# Patient Record
Sex: MALE | Race: WHITE | NOT HISPANIC OR LATINO | Employment: UNEMPLOYED | URBAN - METROPOLITAN AREA
[De-identification: names, ages, dates, MRNs, and addresses within clinical notes are randomized per-mention and may not be internally consistent; named-entity substitution may affect disease eponyms.]

---

## 2017-05-03 ENCOUNTER — HOSPITAL ENCOUNTER (EMERGENCY)
Facility: CLINIC | Age: 57
Discharge: HOME | End: 2017-05-03
Attending: EMERGENCY MEDICINE | Admitting: EMERGENCY MEDICINE
Payer: COMMERCIAL

## 2017-05-03 ENCOUNTER — APPOINTMENT (OUTPATIENT)
Dept: CT IMAGING | Facility: CLINIC | Age: 57
End: 2017-05-03
Attending: EMERGENCY MEDICINE
Payer: COMMERCIAL

## 2017-05-03 VITALS
SYSTOLIC BLOOD PRESSURE: 129 MMHG | HEART RATE: 88 BPM | OXYGEN SATURATION: 97 % | DIASTOLIC BLOOD PRESSURE: 58 MMHG | RESPIRATION RATE: 14 BRPM

## 2017-05-03 DIAGNOSIS — R04.0 EPISTAXIS: ICD-10-CM
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
## 2017-05-03 DIAGNOSIS — F10.220 ACUTE ALCOHOLIC INTOXICATION IN ALCOHOLISM, UNCOMPLICATED (H): ICD-10-CM
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
## 2017-05-03 DIAGNOSIS — S01.512A LACERATION OF LABIAL MUCOSA WITHOUT COMPLICATION, INITIAL ENCOUNTER: ICD-10-CM

## 2017-05-03 LAB
ALBUMIN SERPL-MCNC: 4.2 G/DL (ref 3.4–5)
ALCOHOL BREATH TEST: 0.31 (ref 0–0.01)
ALCOHOL BREATH TEST: NORMAL (ref 0–0.01)
ALP SERPL-CCNC: 61 U/L (ref 40–150)
ALT SERPL W P-5'-P-CCNC: 43 U/L (ref 0–70)
ANION GAP SERPL CALCULATED.3IONS-SCNC: 9 MMOL/L (ref 3–14)
AST SERPL W P-5'-P-CCNC: 33 U/L (ref 0–45)
BASOPHILS # BLD AUTO: 0.1 10E9/L (ref 0–0.2)
BASOPHILS NFR BLD AUTO: 1.4 %
BILIRUB SERPL-MCNC: 0.5 MG/DL (ref 0.2–1.3)
BUN SERPL-MCNC: 19 MG/DL (ref 7–30)
CALCIUM SERPL-MCNC: 8.1 MG/DL (ref 8.5–10.1)
CHLORIDE SERPL-SCNC: 108 MMOL/L (ref 94–109)
CO2 SERPL-SCNC: 28 MMOL/L (ref 20–32)
CREAT SERPL-MCNC: 1.08 MG/DL (ref 0.66–1.25)
DIFFERENTIAL METHOD BLD: NORMAL
EOSINOPHIL # BLD AUTO: 0.1 10E9/L (ref 0–0.7)
EOSINOPHIL NFR BLD AUTO: 1 %
ERYTHROCYTE [DISTWIDTH] IN BLOOD BY AUTOMATED COUNT: 13.7 % (ref 10–15)
GFR SERPL CREATININE-BSD FRML MDRD: 71 ML/MIN/1.7M2
GLUCOSE SERPL-MCNC: 95 MG/DL (ref 70–99)
HCT VFR BLD AUTO: 45.3 % (ref 40–53)
HGB BLD-MCNC: 15.2 G/DL (ref 13.3–17.7)
IMM GRANULOCYTES # BLD: 0 10E9/L (ref 0–0.4)
IMM GRANULOCYTES NFR BLD: 0.2 %
INR PPP: 1.02 (ref 0.86–1.14)
LYMPHOCYTES # BLD AUTO: 2.2 10E9/L (ref 0.8–5.3)
LYMPHOCYTES NFR BLD AUTO: 37.5 %
MCH RBC QN AUTO: 32.3 PG (ref 26.5–33)
MCHC RBC AUTO-ENTMCNC: 33.6 G/DL (ref 31.5–36.5)
MCV RBC AUTO: 96 FL (ref 78–100)
MONOCYTES # BLD AUTO: 0.2 10E9/L (ref 0–1.3)
MONOCYTES NFR BLD AUTO: 4 %
NEUTROPHILS # BLD AUTO: 3.2 10E9/L (ref 1.6–8.3)
NEUTROPHILS NFR BLD AUTO: 55.9 %
NRBC # BLD AUTO: 0 10*3/UL
NRBC BLD AUTO-RTO: 0 /100
PLATELET # BLD AUTO: 186 10E9/L (ref 150–450)
POTASSIUM SERPL-SCNC: 3.9 MMOL/L (ref 3.4–5.3)
PROT SERPL-MCNC: 8.2 G/DL (ref 6.8–8.8)
RBC # BLD AUTO: 4.7 10E12/L (ref 4.4–5.9)
SODIUM SERPL-SCNC: 145 MMOL/L (ref 133–144)
WBC # BLD AUTO: 5.8 10E9/L (ref 4–11)

## 2017-05-03 PROCEDURE — 99284 EMERGENCY DEPT VISIT MOD MDM: CPT | Mod: Z6 | Performed by: EMERGENCY MEDICINE

## 2017-05-03 PROCEDURE — 85025 COMPLETE CBC W/AUTO DIFF WBC: CPT | Performed by: EMERGENCY MEDICINE

## 2017-05-03 PROCEDURE — 99285 EMERGENCY DEPT VISIT HI MDM: CPT | Mod: 25

## 2017-05-03 PROCEDURE — 82075 ASSAY OF BREATH ETHANOL: CPT | Mod: 91

## 2017-05-03 PROCEDURE — 70486 CT MAXILLOFACIAL W/O DYE: CPT

## 2017-05-03 PROCEDURE — 25000132 ZZH RX MED GY IP 250 OP 250 PS 637: Performed by: EMERGENCY MEDICINE

## 2017-05-03 PROCEDURE — 70450 CT HEAD/BRAIN W/O DYE: CPT

## 2017-05-03 PROCEDURE — 85610 PROTHROMBIN TIME: CPT | Performed by: EMERGENCY MEDICINE

## 2017-05-03 PROCEDURE — 80053 COMPREHEN METABOLIC PANEL: CPT | Performed by: EMERGENCY MEDICINE

## 2017-05-03 PROCEDURE — 25000125 ZZHC RX 250

## 2017-05-03 PROCEDURE — 82075 ASSAY OF BREATH ETHANOL: CPT

## 2017-05-03 PROCEDURE — 36415 COLL VENOUS BLD VENIPUNCTURE: CPT

## 2017-05-03 RX ORDER — LIDOCAINE HYDROCHLORIDE 10 MG/ML
INJECTION, SOLUTION EPIDURAL; INFILTRATION; INTRACAUDAL; PERINEURAL
Status: COMPLETED
Start: 2017-05-03 | End: 2017-05-03

## 2017-05-03 RX ORDER — MULTIVITAMIN,THERAPEUTIC
1 TABLET ORAL ONCE
Status: COMPLETED | OUTPATIENT
Start: 2017-05-03 | End: 2017-05-03

## 2017-05-03 RX ORDER — MAGNESIUM OXIDE 400 MG/1
800 TABLET ORAL ONCE
Status: COMPLETED | OUTPATIENT
Start: 2017-05-03 | End: 2017-05-03

## 2017-05-03 RX ORDER — LANOLIN ALCOHOL/MO/W.PET/CERES
100 CREAM (GRAM) TOPICAL ONCE
Status: COMPLETED | OUTPATIENT
Start: 2017-05-03 | End: 2017-05-03

## 2017-05-03 RX ORDER — FOLIC ACID 1 MG/1
1 TABLET ORAL ONCE
Status: COMPLETED | OUTPATIENT
Start: 2017-05-03 | End: 2017-05-03

## 2017-05-03 RX ADMIN — FOLIC ACID 1 MG: 1 TABLET ORAL at 16:09

## 2017-05-03 RX ADMIN — THERA TABS 1 TABLET: TAB at 16:09

## 2017-05-03 RX ADMIN — MAGNESIUM OXIDE TAB 400 MG (241.3 MG ELEMENTAL MG) 800 MG: 400 (241.3 MG) TAB at 16:09

## 2017-05-03 RX ADMIN — LIDOCAINE HYDROCHLORIDE: 10 INJECTION, SOLUTION EPIDURAL; INFILTRATION; INTRACAUDAL; PERINEURAL at 14:49

## 2017-05-03 RX ADMIN — Medication 100 MG: at 16:09

## 2017-05-03 ASSESSMENT — ENCOUNTER SYMPTOMS
SHORTNESS OF BREATH: 0
BACK PAIN: 0
NECK PAIN: 0
FEVER: 0
ABDOMINAL PAIN: 0
VOMITING: 0
WOUND: 1
SEIZURES: 0

## 2017-05-03 NOTE — PROGRESS NOTES
Writer went in to give patient some medication around 1600, when it was noticed patient appeared more intoxicated. An empty beer can was discovered in patient's bed, with three more full cans in personal bag. MD notified, patient placed on watch.

## 2017-05-03 NOTE — ED AVS SNAPSHOT
" [facility], Emergency Department    [address] [facility] [address]    Phone:  [phone]                                       Billy Calzada   MRN: [id number]    Department:  [facility], Emergency Department   Date of Visit:  5/3/2017           Patient Information     Date Of Birth          1960        Your diagnoses for this visit were:     Acute alcoholic intoxication in alcoholism, uncomplicated (H)     Laceration of labial mucosa without complication, initial encounter     Fall, initial encounter     Epistaxis        You were seen by Grace Knight MD and Kavya Chan MD.        Discharge Instructions         Alcohol Abuse  Alcoholic drinks are harmful when you have too many of them. There is no set number of drinks that defines too much. Drinking that disrupts your life or your health is called alcohol abuse. Alcohol abuse can hurt your relationships with others. You may lose friends, a spouse, or even your job. You may be abusing alcohol if any of the following are true for you:    Duties at home or with  suffer because of drinking.    Duties at work or in school suffer because of drinking.    You have missed work or school because of drinking.    You use alcohol while driving or operating machinery.    You have legal problems such as arrests due to drinking.    You keep drinking even though it causes serious problems in your life.  Health effects  Alcohol abuse causes health problems. Sometimes this can happen after only drinking a  little.\" There is no set number of drinks or amount of alcohol that defines too much. The more you drink at one time, and the more often you drink determine both the short-term and long-term health effects. It affects all parts of your body and your health, including your:    Brain. Alcohol is a central nervous system depressant. It can damage parts of the brain that affect your balance, memory, thinking, and emotions. It can " cause memory loss, blackouts, depression, agitation, sleep cycle changes, and seizures. These changes may or may not be reversible.    Heart and vascular system. Alcohol affects multiple areas. It can damage heart muscle causing cardiomyopathy, which is a weakening and stretching of the heart muscle. This can lead to trouble breathing, an irregular heartbeat, atrial fibrillation, leg swelling, and heart failure. Alcohol use makes the blood vessels stiffen causing hypertension (high blood pressure). All of these problems increase your risk of having heart attacks or strokes.    Liver. Alcohol causes fat to build up in the liver, affecting its normal function. This increases the risk for hepatitis, leading to abdominal pain, appetite loss, jaundice, bleeding problems, liver fibrosis, and cirrhosis. This, in turn, can affect your ability to fight off infections, and can cause diabetes. The liver changes prevent it from removing toxins in your blood that can cause encephalopathy which may show with confusion, altered level of consciousness, personality changes, memory loss, seizures, coma, and death.    Pancreas. Alcohol can cause inflammation of the pancreas, or pancreatitis. This can cause abdominal pain, fever, and diabetes.    Immune system. Alcohol weakens your immune system in a number of ways. It suppresses your immune system making it harder to fight infections and colds. It also increases the chance of getting pneumonia and tuberculosis.    Cancer. Alcohol is a risk factor for developing cancer of the mouth, esophagus, pharynx, larynx, liver, and breast.    Sexual function. Alcohol can lead to sexual problems.  Home care  The following guidelines will help you deal with alcohol abuse:    Admit you have a problem with alcohol.    Ask for help from your health care provider and trusted family members or close friends.    Get help from people trained in dealing with alcohol abuse. This may be individual counseling  or group therapy, or it may be a supervised alcohol treatment program.    Join a self-help group for alcohol abuse such as Alcoholics Anonymous (AA).    Avoid people who abuse alcohol or tempt you to drink.  Follow-up care  Follow up as advised by the doctor or our staff. Contact these groups to get help:    Alcoholics Anonymous (AA): Go to www.aa.org or check the phone book for meetings near you.    National Alcohol and Substance Abuse Information Center (NASAI): 828.131.6635 www.addictioncareGhostery    National [place] on Alcoholism and Drug Dependence (NCADD): 711-QCH-SJEB (401-4139) www.ncadd.org    Al-Anon: 035-8JG-QLWV (865-2221) www.al-anon.org  Call 911  Call 911 if any of these occur:    Trouble breathing or slow irregular breathing    Chest pain    Sudden weakness on one side of your body or sudden trouble speaking    Heavy bleeding or vomiting blood    Very drowsy or trouble awakening    Fainting or loss of consciousness    Rapid heart rate    Seizure  When to seek medical care  Get prompt medical attention if you have:    Confusion    Hallucinations (seeing, hearing, or feeling things that aren t there)    Pain in your upper abdomen that gets worse    Repeated vomiting or black or tarry stools    Severe shakiness    5157-8240 [employer]. [address] [place] [address]. All rights reserved. This information is not intended as a substitute for professional medical care. Always follow your healthcare professional's instructions.          Nosebleed (Adult)    Bleeding from the nose most commonly occurs due to injury or drying and cracking of the inner lining of the nose. Most nosebleeds are due to dry air or nose-picking. They can occur during a common cold or an allergy attack. They can also occur on a very hot day, or from dry air in the winter.  If the bleeding site is found, it may be cauterized (treated to cause a blood clot to form). This may be done with a chemical,  heat, or electricity. If the bleeding continues after the site is cauterized, or if the site cannot be found, packing may be placed in your nose. This is to apply pressure and stop the bleeding. The packing may be made of gauze or sponge. A small balloon catheter is sometimes used. These must be removed by your doctor. Some types of packing dissolve on their own.  Home care    If packing was put in your nose, unless told otherwise, do not pull on it or try to remove it yourself. You will be given an appointment to have it removed. You may also have been given antibiotics to prevent a sinus infection. If so, finish all of the medicine.    Do not blow your nose for 12 hours after the bleeding stops. This will allow a strong blood clot to form. Do not pick your nose. This may restart bleeding.    Avoid drinking alcohol and hot liquids for the next two days. Alcohol or hot liquids in your mouth can dilate blood vessels in your nose. This can cause bleeding to start again.    Do not take ibuprofen, naproxen, or aspirin-containing medicines. These thin the blood and may promote nose bleeding. You may take acetaminophen for pain, unless another pain medicine was prescribed.    If the bleeding starts again, sit up and lean forward to prevent swallowing blood. Pinch your nose tightly on both sides, as depicted above, for 10 to 15 minutes.  Time yourself, and don t release the pressure on your nose until 10 minutes is up. If bleeding is not controlled, continue to pinch your nose and call your health care provider or return to this facility.    If you have a cold, allergies, or dry nasal membranes, lubricate the nasal passages. Apply a small amount of petroleum jelly inside the nose with a cotton swab twice a day (morning and night).    Avoid overheating your home, which can dry the air and worsen your condition.    A humidifier in the room where you sleep will add moisture to the air.    Use a saline nasal spray to keep  nasal passages moist.    Do not pick your nose. Keep fingernails trimmed to decrease risk of bleeds.  Follow-up care  Follow up with your health care provider, or as advised. Nasal packing should be rechecked or removed within 2 to 3 days.  When to seek medical advice  Call your health care provider right away if any of these occur.    Another nosebleed that you cannot control    Dizziness, weakness or fainting    You become tired or confused    Fever of 100.4 F (38 C) or higher, or as directed by your health care provider    Headache    Sinus or facial pain    Shortness of breath or trouble breathing    1890-7375 [employer]. [address] [place] [address]. All rights reserved. This information is not intended as a substitute for professional medical care. Always follow your healthcare professional's instructions.          Lip or Mouth Laceration  A laceration is a cut through the skin. When the cut is on the outside of the lip, it may be closed with stitches, surgical tape, or skin glue. Cuts inside the mouth may be sutured or left open, depending on the size. When stitches are used in the mouth, they are usually the kind that dissolve.  A tetanus shot may be given if you are not current on this vaccination and the object that caused the cut may lead to tetanus.    Home care    If you were given an antibiotic to prevent infection, do not stop taking this medication until you have finished the prescribed course or the healthcare provider tells you to stop.    The healthcare provider may prescribe medications for pain. Follow instructions for taking these medications.     Follow the healthcare provider s instructions on how to care for the cut.    Wash your hands with soap and warm water before and after caring for your cut. This helps prevent infection.    If the cut is inside your mouth, clean the wound by rinsing your mouth after each meal and at bedtime with a mixture of equal parts  water and hydrogen peroxide. (Do not swallow!) Or, you can use a cotton swab to apply hydrogen peroxide directly onto the cut.    Mouth wounds can cause pain when chewing. Soft foods can help with this. If needed, use a local, over-the-counter numbing solution for pain relief, such as one for teething babies. Apply this directly to the wound with a cotton-tip swab or your finger.    If the wound is bandaged, leave the original bandage in place for 24 hours. Replace it if it becomes wet or dirty. After 24 hours, change it once a day or as directed.    Clean the wound daily. First, remove the bandage. Then wash the area gently with soap and warm water, or as directed by your child s provider. Use a wet cotton swab to loosen and remove any blood or crust that forms. After cleaning, apply a thin layer of antibiotic ointment if advised. Then put on a new bandage.    If the cut is on the outside of the lip and sutures were used, you may shower as usual after the first 24 hours, but do not put your head under water or swim until the sutures are removed. After removing the bandage, wash the area with soap and water. Use a wet cotton swab to loosen and remove any blood or crust that forms. After cleaning, keep the wound clean and dry. Talk with your doctor before applying any antibiotic ointment to the wound. You may apply an adhesive bandage or leave the wound open. Unless told otherwise, sutures on the inside of the mouth will likely dissolve on their own.    If skin glue was used, do not scratch, rub, or pick at the adhesive film. Do not place tape directly over the film. Do not apply liquid, ointment, or creams to the wound while the film is in place. Do not clean the wound with peroxide and do not apply ointment. Avoid activities that cause heavy sweating until the film has fallen off. Protect the wound from prolonged exposure to sunlight or tanning lamps. You may shower as usual but do not soak the wound in water (no  swimming).  Follow-up care  Follow up with your healthcare provider as directed. If you have sutures that won't dissolve on their own, return as directed to have them removed.  When to seek medical advice  Call your healthcare provider right away if any of these occur:    Wound bleeding not controlled by direct pressure    Signs of infection, including increasing pain in the wound, increasing wound redness or swelling, or pus or bad odor coming from the wound    Fever of 100.4 F (38. C) or higher or as directed by your healthcare provider    Stitches come apart or fall out or surgical tape falls off before 5 days    Wound edges re-open    Wound changes colors    Numbness around the wound     5107-1299 [employer]. [address] [place] [address], [place] [address]. All rights reserved. This information is not intended as a substitute for professional medical care. Always follow your healthcare professional's instructions.      Please make an appointment to follow up with Your Primary Care Provider in 7 days for wound check and help with alcohol use. You should eat only soft foods or liquids until the cut inside your mouth is healed.         24 Hour Appointment Hotline       To make an appointment at any [facility], call [phone] ([phone]). If you don't have a family doctor or clinic, we will help you find one. [facility] are conveniently located to serve the needs of you and your family.             Review of your medicines      Our records show that you are taking the medicines listed below. If these are incorrect, please call your family doctor or clinic.        Dose / Directions Last dose taken    atenolol 100 MG tablet   Commonly known as:  TENORMIN   Dose:  100 mg        Take 100 mg by mouth daily.   Refills:  0        LORazepam 2 MG tablet   Commonly known as:  ATIVAN   Dose:  2 mg        Take 2 mg by mouth 2 times daily as needed.   Refills:  0        MULTIVITAL PO        Take  by  "mouth.   Refills:  0        simvastatin 20 MG tablet   Commonly known as:  ZOCOR   Dose:  20 mg        Take 20 mg by mouth At Bedtime.   Refills:  0                Procedures and tests performed during your visit     Procedure/Test Number of Times Performed    Alcohol breath test POCT 2    CBC with platelets differential 1    CT Head w/o Contrast 1    CT Maxillofacial w/o Contrast 1    Comprehensive metabolic panel 1    INR 1    Vital signs 2      Orders Needing Specimen Collection     None      Pending Results     No orders found from 5/1/2017 to 5/4/2017.            Pending Culture Results     No orders found from 5/1/2017 to 5/4/2017.            Pending Results Instructions     If you had any lab results that were not finalized at the time of your Discharge, you can call the ED Lab Result RN at [phone]. You will be contacted by this team for any positive Lab results or changes in treatment. The nurses are available 7 days a week from 10A to 6:30P.  You can leave a message 24 hours per day and they will return your call.        Thank you for choosing [facility]       Thank you for choosing [facility] for your care. Our goal is always to provide you with excellent care. Hearing back from our patients is one way we can continue to improve our services. Please take a few minutes to complete the written survey that you may receive in the mail after you visit with us. Thank you!        [email or web address] Information     [email or web address] lets you send messages to your doctor, view your test results, renew your prescriptions, schedule appointments and more. To sign up, go to [email or web address] . Click on \"Log in\" on the left side of the screen, which will take you to the Welcome page. Then click on \"Sign up Now\" on the right side of the page.     You will be asked to enter the access code listed below, as well as some personal information. Please follow the directions to create your username and password.     Your access code is: " [email or web address]  Expires: 2017  3:42 PM     Your access code will  in 90 days. If you need help or a new code, please call your [facility] or [phone].        Care EveryWhere ID     This is your Care EveryWhere ID. This could be used by other organizations to access your [facility] medical records  [id number]        After Visit Summary       This is your record. Keep this with you and show to your community pharmacist(s) and doctor(s) at your next visit.

## 2017-05-03 NOTE — ED AVS SNAPSHOT
[facility] [place], Emergency Department    [address] [place] [address]    Phone:  [phone]                                       Billy Calzada   MRN: [id number]    Department:  [facility], Emergency Department   Date of Visit:  5/3/2017           After Visit Summary Signature Page     I have received my discharge instructions, and my questions have been answered. I have discussed any challenges I see with this plan with the nurse or doctor.    ..........................................................................................................................................  Patient/Patient Representative Signature      ..........................................................................................................................................  Patient Representative Print Name and Relationship to Patient    ..................................................               ................................................  Date                                            Time    ..........................................................................................................................................  Reviewed by Signature/Title    ...................................................              ..............................................  Date                                                            Time

## 2017-05-03 NOTE — ED NOTES
"Pt was at store and had an unwitnessed fall.  Hx of alcoholism and seizures.  Takes ativan 2x day, but states he \"doesnt take it if he drinks\", Pt did not take his doses yesterday or this morning.  A&O x4, PERRLA.  Pt states he \"had a six pack today\".  Abrasions to left side of face, dried blood from nose and mouth, teeth intact.  VSS, airway clear.   "

## 2017-05-03 NOTE — ED PROVIDER NOTES
"  History     Chief Complaint   Patient presents with     Fall     HPI  Billy Calzada is a 57 year old male with a history of hypertension, alcohol abuse, and anxiety who presents for evaluation after a fall in the setting of alcohol intoxication. The patient reports that he drank \"too much\" today, and subsequently fell at a [employer] store. He states that he fell because he lost his balance. He denies any loss of consciousness or any head injury. He reports that he did not injure his back or neck and denies any pain in these areas. He denies any other pain, including any abdominal pain or extremity pain. He does note that he sustained a bloody nose from the fall, as well as abrasion to his chin. He denies dental pain. The patient states that he does not drink daily, though indicates he did drink yesterday and the day before. He states that he does suffer from tremors if he stops drinking. He is unsure if he has had a withdrawal seizure in the past. The patient denies any other recreational drug use. He denies any suicidal or homicidal ideation, and denies any hallucinations. He believes his last tetanus shot was 1-2 years ago.     I have reviewed the Medications, Allergies, Past Medical and Surgical History, and Social History in the Epic system.    No current facility-administered medications for this encounter.      Current Outpatient Prescriptions   Medication     Multiple Vitamins-Minerals (MULTIVITAL PO)     lorazepam (ATIVAN) 2 MG tablet     simvastatin (ZOCOR) 20 MG tablet     atenolol (TENORMIN) 100 MG tablet     Past Medical History:   Diagnosis Date     Anxiety      Hyperlipidemia      Hypertension        History reviewed. No pertinent surgical history.    No family history on file.    Social History   Substance Use Topics     Smoking status: Never Smoker     Smokeless tobacco: Never Used     Alcohol use 1.2 oz/week     2 Cans of beer per week      Comment: 1 L vodka daily x 30 years        Allergies "   Allergen Reactions     Iodine I 131 Tositumomab      Keflex [Cephalexin-Fd&C Yellow #6]        Review of Systems   Constitutional: Negative for fever.   HENT: Positive for nosebleeds. Negative for dental problem.    Respiratory: Negative for shortness of breath.    Cardiovascular: Negative for chest pain.   Gastrointestinal: Negative for abdominal pain and vomiting.   Musculoskeletal: Negative for back pain and neck pain.   Skin: Positive for wound (chin abrasion).   Neurological: Negative for seizures and syncope.       Physical Exam     ED Triage Vitals   Enc Vitals Group      BP 05/03/17 1342 115/77      Pulse -- 88      Resp 05/03/17 1430 16      Temp -- 98      Temp src --       SpO2 05/03/17 1400 98 %      Weight --       Height --       Head Cir --       Peak Flow --       Pain Score --       Pain Loc --       Pain Edu? --       Excl. in GC? --         Physical Exam    GEN:  Alert, well developed, no acute distress, but smells of alcohol and is clinically intoxicated  HEENT:  PERRL, EOMI, Mucous membranes are moist. The right nasal passage has blood inside it but no active bleeding. No blood in the left naris. There is an abrasion on the left chin area. There is a mucosal laceration inside the upper lip. No laceration on the face. No loose teeth or malocclusion.  There is tenderness with palpation on the left mandibular incisor tooth.  No noted gingival injury or other intraoral injury.     Cardio:  RRR, no murmur, radial pulses equal bilaterally  PULM:  Lungs clear, good air movement, no wheezes, rales   Abd:  Soft, normal bowel sounds, no focal tenderness  Back exam:  No C-spine, T-spine or L-spine tenderess, No CVA tenderness  Musculoskeletal:  normal range of motion of all 4 extremities, no lower extremity swelling or calf tenderness  Neuro:  Alert and oriented X3, Follows commands, normal strength and sensation in all 4 extremities  Skin:  Warm, dry   ED Course     ED Course     Procedures              Critical Care time:  none  He was given oral Thiamine, Folate, Magnesium and a multivitamin.    Labs are normal except as shown.  CT of the head and facial bones are both negative for acute fracture or intracranial injury.  There is a small parieto-occipital soft tissue hemnatoma     Patient is not interested in going to detox.  He wants to call his ex-wife for a ride home.     Labs Ordered and Resulted from Time of ED Arrival Up to the Time of Departure from the ED   COMPREHENSIVE METABOLIC PANEL - Abnormal; Notable for the following:        Result Value    Sodium 145 (*)     Calcium 8.1 (*)     All other components within normal limits   ALCOHOL BREATH TEST POCT - Abnormal; Notable for the following:     Alcohol Breath Test 0.307 (*)     All other components within normal limits   ALCOHOL BREATH TEST POCT - Normal   CBC WITH PLATELETS DIFFERENTIAL   INR   VITAL SIGNS   VITAL SIGNS            Assessments & Plan (with Medical Decision Making)   Acute alcohol intoxication in a patient with alcohol dependence.  I suspect the fall was because of the intoxication.  He denies having any medical symptoms associated with the fall such as chest pain or shortness of breath. Denies infectius symptoms.  There are no fractures found on imaging.  He does have a mucosal laceration inside the mouth.  This was not repaired. He was advised to eat a soft food or liquid diet until the wound inside the mouth is healed. He is not interested in detox and will be discharged with a sober ride or after he is sober. The epistaxis had stopped at the time of ED arrival and did not require intervention.     I have reviewed the nursing notes.    I have reviewed the findings, diagnosis, plan and need for follow up with the patient.    Current Discharge Medication List          Final diagnoses:   Acute alcoholic intoxication in alcoholism, uncomplicated (H)   Laceration of labial mucosa without complication, initial encounter   Fall, initial  encounter   Epistaxis   I, Óscar Camilo, am serving as a trained medical scribe to document services personally performed by Grace Knight MD, based on the provider's statements to me.      I, Grace Knight MD, was physically present and have reviewed and verified the accuracy of this note documented by Óscar Camilo.      5/3/2017   [facility], [place], EMERGENCY DEPARTMENT     Grace Knight MD  05/03/17 3180

## 2017-05-04 NOTE — ED NOTES
Seen by Dr. Eugene marinelli. Patient with AMS/ETOH intoxication. He was found to have a beer in bed here in the ED. He is sobering appropriately. He is up and walking around with a steady gait. A sober ride is picking him up.    Kavya Barros MD  05/03/17 0659

## 2017-05-04 NOTE — ED NOTES
Patient unable to obtain sober ride. MD notified. Patient ok'd for d/c. Given bus token and printed [place].

## 2017-05-04 NOTE — DISCHARGE INSTRUCTIONS
"  Alcohol Abuse  Alcoholic drinks are harmful when you have too many of them. There is no set number of drinks that defines too much. Drinking that disrupts your life or your health is called alcohol abuse. Alcohol abuse can hurt your relationships with others. You may lose friends, a spouse, or even your job. You may be abusing alcohol if any of the following are true for you:    Duties at home or with  suffer because of drinking.    Duties at work or in school suffer because of drinking.    You have missed work or school because of drinking.    You use alcohol while driving or operating machinery.    You have legal problems such as arrests due to drinking.    You keep drinking even though it causes serious problems in your life.  Health effects  Alcohol abuse causes health problems. Sometimes this can happen after only drinking a  little.\" There is no set number of drinks or amount of alcohol that defines too much. The more you drink at one time, and the more often you drink determine both the short-term and long-term health effects. It affects all parts of your body and your health, including your:    Brain. Alcohol is a central nervous system depressant. It can damage parts of the brain that affect your balance, memory, thinking, and emotions. It can cause memory loss, blackouts, depression, agitation, sleep cycle changes, and seizures. These changes may or may not be reversible.    Heart and vascular system. Alcohol affects multiple areas. It can damage heart muscle causing cardiomyopathy, which is a weakening and stretching of the heart muscle. This can lead to trouble breathing, an irregular heartbeat, atrial fibrillation, leg swelling, and heart failure. Alcohol use makes the blood vessels stiffen causing hypertension (high blood pressure). All of these problems increase your risk of having heart attacks or strokes.    Liver. Alcohol causes fat to build up in the liver, affecting its normal " function. This increases the risk for hepatitis, leading to abdominal pain, appetite loss, jaundice, bleeding problems, liver fibrosis, and cirrhosis. This, in turn, can affect your ability to fight off infections, and can cause diabetes. The liver changes prevent it from removing toxins in your blood that can cause encephalopathy which may show with confusion, altered level of consciousness, personality changes, memory loss, seizures, coma, and death.    Pancreas. Alcohol can cause inflammation of the pancreas, or pancreatitis. This can cause abdominal pain, fever, and diabetes.    Immune system. Alcohol weakens your immune system in a number of ways. It suppresses your immune system making it harder to fight infections and colds. It also increases the chance of getting pneumonia and tuberculosis.    Cancer. Alcohol is a risk factor for developing cancer of the mouth, esophagus, pharynx, larynx, liver, and breast.    Sexual function. Alcohol can lead to sexual problems.  Home care  The following guidelines will help you deal with alcohol abuse:    Admit you have a problem with alcohol.    Ask for help from your health care provider and trusted family members or close friends.    Get help from people trained in dealing with alcohol abuse. This may be individual counseling or group therapy, or it may be a supervised alcohol treatment program.    Join a self-help group for alcohol abuse such as Alcoholics Anonymous (AA).    Avoid people who abuse alcohol or tempt you to drink.  Follow-up care  Follow up as advised by the doctor or our staff. Contact these groups to get help:    Alcoholics Anonymous (AA): Go to www.aa.org or check the phone book for meetings near you.    National Alcohol and Substance Abuse Information Center (NASAIC): 214.194.5108 www.addictioncareoptions.com    National [place] on Alcoholism and Drug Dependence (NCADD): 576-IIO-ZRLM (694-2246) www.ncadd.org    Al-Anon: 000-8PA-PLUN (018-8501)  www.al-anon.org  Call 911  Call 911 if any of these occur:    Trouble breathing or slow irregular breathing    Chest pain    Sudden weakness on one side of your body or sudden trouble speaking    Heavy bleeding or vomiting blood    Very drowsy or trouble awakening    Fainting or loss of consciousness    Rapid heart rate    Seizure  When to seek medical care  Get prompt medical attention if you have:    Confusion    Hallucinations (seeing, hearing, or feeling things that aren t there)    Pain in your upper abdomen that gets worse    Repeated vomiting or black or tarry stools    Severe shakiness    5548-6544 [employer]. [address] [place] [address]. All rights reserved. This information is not intended as a substitute for professional medical care. Always follow your healthcare professional's instructions.          Nosebleed (Adult)    Bleeding from the nose most commonly occurs due to injury or drying and cracking of the inner lining of the nose. Most nosebleeds are due to dry air or nose-picking. They can occur during a common cold or an allergy attack. They can also occur on a very hot day, or from dry air in the winter.  If the bleeding site is found, it may be cauterized (treated to cause a blood clot to form). This may be done with a chemical, heat, or electricity. If the bleeding continues after the site is cauterized, or if the site cannot be found, packing may be placed in your nose. This is to apply pressure and stop the bleeding. The packing may be made of gauze or sponge. A small balloon catheter is sometimes used. These must be removed by your doctor. Some types of packing dissolve on their own.  Home care    If packing was put in your nose, unless told otherwise, do not pull on it or try to remove it yourself. You will be given an appointment to have it removed. You may also have been given antibiotics to prevent a sinus infection. If so, finish all of the medicine.    Do not  blow your nose for 12 hours after the bleeding stops. This will allow a strong blood clot to form. Do not pick your nose. This may restart bleeding.    Avoid drinking alcohol and hot liquids for the next two days. Alcohol or hot liquids in your mouth can dilate blood vessels in your nose. This can cause bleeding to start again.    Do not take ibuprofen, naproxen, or aspirin-containing medicines. These thin the blood and may promote nose bleeding. You may take acetaminophen for pain, unless another pain medicine was prescribed.    If the bleeding starts again, sit up and lean forward to prevent swallowing blood. Pinch your nose tightly on both sides, as depicted above, for 10 to 15 minutes.  Time yourself, and don t release the pressure on your nose until 10 minutes is up. If bleeding is not controlled, continue to pinch your nose and call your health care provider or return to this facility.    If you have a cold, allergies, or dry nasal membranes, lubricate the nasal passages. Apply a small amount of petroleum jelly inside the nose with a cotton swab twice a day (morning and night).    Avoid overheating your home, which can dry the air and worsen your condition.    A humidifier in the room where you sleep will add moisture to the air.    Use a saline nasal spray to keep nasal passages moist.    Do not pick your nose. Keep fingernails trimmed to decrease risk of bleeds.  Follow-up care  Follow up with your health care provider, or as advised. Nasal packing should be rechecked or removed within 2 to 3 days.  When to seek medical advice  Call your health care provider right away if any of these occur.    Another nosebleed that you cannot control    Dizziness, weakness or fainting    You become tired or confused    Fever of 100.4 F (38 C) or higher, or as directed by your health care provider    Headache    Sinus or facial pain    Shortness of breath or trouble breathing    8096-7905 [employer] [address]  [place] [address]. All rights reserved. This information is not intended as a substitute for professional medical care. Always follow your healthcare professional's instructions.          Lip or Mouth Laceration  A laceration is a cut through the skin. When the cut is on the outside of the lip, it may be closed with stitches, surgical tape, or skin glue. Cuts inside the mouth may be sutured or left open, depending on the size. When stitches are used in the mouth, they are usually the kind that dissolve.  A tetanus shot may be given if you are not current on this vaccination and the object that caused the cut may lead to tetanus.    Home care    If you were given an antibiotic to prevent infection, do not stop taking this medication until you have finished the prescribed course or the healthcare provider tells you to stop.    The healthcare provider may prescribe medications for pain. Follow instructions for taking these medications.     Follow the healthcare provider s instructions on how to care for the cut.    Wash your hands with soap and warm water before and after caring for your cut. This helps prevent infection.    If the cut is inside your mouth, clean the wound by rinsing your mouth after each meal and at bedtime with a mixture of equal parts water and hydrogen peroxide. (Do not swallow!) Or, you can use a cotton swab to apply hydrogen peroxide directly onto the cut.    Mouth wounds can cause pain when chewing. Soft foods can help with this. If needed, use a local, over-the-counter numbing solution for pain relief, such as one for teething babies. Apply this directly to the wound with a cotton-tip swab or your finger.    If the wound is bandaged, leave the original bandage in place for 24 hours. Replace it if it becomes wet or dirty. After 24 hours, change it once a day or as directed.    Clean the wound daily. First, remove the bandage. Then wash the area gently with soap and warm  water, or as directed by your child s provider. Use a wet cotton swab to loosen and remove any blood or crust that forms. After cleaning, apply a thin layer of antibiotic ointment if advised. Then put on a new bandage.    If the cut is on the outside of the lip and sutures were used, you may shower as usual after the first 24 hours, but do not put your head under water or swim until the sutures are removed. After removing the bandage, wash the area with soap and water. Use a wet cotton swab to loosen and remove any blood or crust that forms. After cleaning, keep the wound clean and dry. Talk with your doctor before applying any antibiotic ointment to the wound. You may apply an adhesive bandage or leave the wound open. Unless told otherwise, sutures on the inside of the mouth will likely dissolve on their own.    If skin glue was used, do not scratch, rub, or pick at the adhesive film. Do not place tape directly over the film. Do not apply liquid, ointment, or creams to the wound while the film is in place. Do not clean the wound with peroxide and do not apply ointment. Avoid activities that cause heavy sweating until the film has fallen off. Protect the wound from prolonged exposure to sunlight or tanning lamps. You may shower as usual but do not soak the wound in water (no swimming).  Follow-up care  Follow up with your healthcare provider as directed. If you have sutures that won't dissolve on their own, return as directed to have them removed.  When to seek medical advice  Call your healthcare provider right away if any of these occur:    Wound bleeding not controlled by direct pressure    Signs of infection, including increasing pain in the wound, increasing wound redness or swelling, or pus or bad odor coming from the wound    Fever of 100.4 F (38. C) or higher or as directed by your healthcare provider    Stitches come apart or fall out or surgical tape falls off before 5 days    Wound edges re-open    Wound  changes colors    Numbness around the wound     1466-5440 [employer]. [address] [place] [address], [place] [address]. All rights reserved. This information is not intended as a substitute for professional medical care. Always follow your healthcare professional's instructions.      Please make an appointment to follow up with Your Primary Care Provider in 7 days for wound check and help with alcohol use. You should eat only soft foods or liquids until the cut inside your mouth is healed.

## 2017-06-02 ENCOUNTER — TRANSFERRED RECORDS (OUTPATIENT)
Dept: HEALTH INFORMATION MANAGEMENT | Facility: CLINIC | Age: 57
End: 2017-06-02

## 2017-11-19 ENCOUNTER — TRANSFERRED RECORDS (OUTPATIENT)
Dept: HEALTH INFORMATION MANAGEMENT | Facility: CLINIC | Age: 57
End: 2017-11-19

## 2017-12-02 ENCOUNTER — TRANSFERRED RECORDS (OUTPATIENT)
Dept: HEALTH INFORMATION MANAGEMENT | Facility: CLINIC | Age: 57
End: 2017-12-02

## 2017-12-29 NOTE — TELEPHONE ENCOUNTER
APPT INFO    Date /Time: 1/11/2018   Reason for Appt: Tremors   Ref Provider/Clinic: Dr Spear, [facility]   Are there internal records? Yes/No?  IF YES, list clinic names: No   Are there outside records? Yes/No? Yes  See Above  [facility]   Patient Contact (Y/N) & Call Details: No referred   Action: Requested records [facility] and [facility]     OUTSIDE RECORDS CHECKLIST     CLINIC NAME COMMENTS REC (x) IMG (x)   [facility]  x na   [facility]  x x

## 2018-01-02 NOTE — TELEPHONE ENCOUNTER
Records Received From: [facility]     Date/Exam/Location  (specify location if different)   Office Notes: 11/1/17, 9/29/17, 4/14/17, 11/21/16, 9/23/16   Labs: 2016, 2017

## 2018-01-04 ENCOUNTER — HOSPITAL ENCOUNTER (EMERGENCY)
Facility: CLINIC | Age: 58
Discharge: HOME | End: 2018-01-05
Attending: EMERGENCY MEDICINE | Admitting: EMERGENCY MEDICINE
Payer: COMMERCIAL

## 2018-01-04 DIAGNOSIS — F10.920 ALCOHOLIC INTOXICATION WITHOUT COMPLICATION (H): ICD-10-CM

## 2018-01-04 LAB
ALBUMIN SERPL-MCNC: 4 G/DL (ref 3.4–5)
ALCOHOL BREATH TEST: 0.3 (ref 0–0.01)
ALP SERPL-CCNC: 58 U/L (ref 40–150)
ALT SERPL W P-5'-P-CCNC: 31 U/L (ref 0–70)
ANION GAP SERPL CALCULATED.3IONS-SCNC: 11 MMOL/L (ref 3–14)
AST SERPL W P-5'-P-CCNC: 35 U/L (ref 0–45)
BASOPHILS # BLD AUTO: 0.1 10E9/L (ref 0–0.2)
BASOPHILS NFR BLD AUTO: 2 %
BILIRUB SERPL-MCNC: 0.6 MG/DL (ref 0.2–1.3)
BUN SERPL-MCNC: 13 MG/DL (ref 7–30)
CALCIUM SERPL-MCNC: 8.1 MG/DL (ref 8.5–10.1)
CHLORIDE SERPL-SCNC: 112 MMOL/L (ref 94–109)
CO2 SERPL-SCNC: 24 MMOL/L (ref 20–32)
CREAT SERPL-MCNC: 0.86 MG/DL (ref 0.66–1.25)
DIFFERENTIAL METHOD BLD: NORMAL
EOSINOPHIL # BLD AUTO: 0.1 10E9/L (ref 0–0.7)
EOSINOPHIL NFR BLD AUTO: 2.4 %
ERYTHROCYTE [DISTWIDTH] IN BLOOD BY AUTOMATED COUNT: 14.1 % (ref 10–15)
GFR SERPL CREATININE-BSD FRML MDRD: >90 ML/MIN/1.7M2
GLUCOSE SERPL-MCNC: 82 MG/DL (ref 70–99)
HCT VFR BLD AUTO: 48.1 % (ref 40–53)
HGB BLD-MCNC: 15.6 G/DL (ref 13.3–17.7)
IMM GRANULOCYTES # BLD: 0 10E9/L (ref 0–0.4)
IMM GRANULOCYTES NFR BLD: 0.2 %
LYMPHOCYTES # BLD AUTO: 3.3 10E9/L (ref 0.8–5.3)
LYMPHOCYTES NFR BLD AUTO: 60.8 %
MAGNESIUM SERPL-MCNC: 2.4 MG/DL (ref 1.6–2.3)
MCH RBC QN AUTO: 31.6 PG (ref 26.5–33)
MCHC RBC AUTO-ENTMCNC: 32.4 G/DL (ref 31.5–36.5)
MCV RBC AUTO: 98 FL (ref 78–100)
MONOCYTES # BLD AUTO: 0.3 10E9/L (ref 0–1.3)
MONOCYTES NFR BLD AUTO: 5.4 %
NEUTROPHILS # BLD AUTO: 1.6 10E9/L (ref 1.6–8.3)
NEUTROPHILS NFR BLD AUTO: 29.2 %
NRBC # BLD AUTO: 0 10*3/UL
NRBC BLD AUTO-RTO: 0 /100
PLATELET # BLD AUTO: 215 10E9/L (ref 150–450)
POTASSIUM SERPL-SCNC: 4.5 MMOL/L (ref 3.4–5.3)
PROT SERPL-MCNC: 7.8 G/DL (ref 6.8–8.8)
RBC # BLD AUTO: 4.93 10E12/L (ref 4.4–5.9)
SODIUM SERPL-SCNC: 147 MMOL/L (ref 133–144)
WBC # BLD AUTO: 5.4 10E9/L (ref 4–11)

## 2018-01-04 PROCEDURE — 99285 EMERGENCY DEPT VISIT HI MDM: CPT | Mod: 25 | Performed by: EMERGENCY MEDICINE

## 2018-01-04 PROCEDURE — 96361 HYDRATE IV INFUSION ADD-ON: CPT | Performed by: EMERGENCY MEDICINE

## 2018-01-04 PROCEDURE — 83735 ASSAY OF MAGNESIUM: CPT | Performed by: EMERGENCY MEDICINE

## 2018-01-04 PROCEDURE — 82075 ASSAY OF BREATH ETHANOL: CPT | Performed by: EMERGENCY MEDICINE

## 2018-01-04 PROCEDURE — 82075 ASSAY OF BREATH ETHANOL: CPT | Mod: 91 | Performed by: EMERGENCY MEDICINE

## 2018-01-04 PROCEDURE — 96374 THER/PROPH/DIAG INJ IV PUSH: CPT | Performed by: EMERGENCY MEDICINE

## 2018-01-04 PROCEDURE — 80053 COMPREHEN METABOLIC PANEL: CPT | Performed by: EMERGENCY MEDICINE

## 2018-01-04 PROCEDURE — 25000128 H RX IP 250 OP 636: Performed by: EMERGENCY MEDICINE

## 2018-01-04 PROCEDURE — 85025 COMPLETE CBC W/AUTO DIFF WBC: CPT | Performed by: EMERGENCY MEDICINE

## 2018-01-04 PROCEDURE — 99284 EMERGENCY DEPT VISIT MOD MDM: CPT | Mod: Z6 | Performed by: EMERGENCY MEDICINE

## 2018-01-04 PROCEDURE — 25000132 ZZH RX MED GY IP 250 OP 250 PS 637: Performed by: EMERGENCY MEDICINE

## 2018-01-04 RX ORDER — LORAZEPAM 2 MG/ML
1 INJECTION INTRAMUSCULAR ONCE
Status: COMPLETED | OUTPATIENT
Start: 2018-01-04 | End: 2018-01-04

## 2018-01-04 RX ORDER — PRENATAL VIT/IRON FUM/FOLIC AC 27MG-0.8MG
1 TABLET ORAL ONCE
Status: COMPLETED | OUTPATIENT
Start: 2018-01-04 | End: 2018-01-04

## 2018-01-04 RX ADMIN — PRENATAL VIT W/ FE FUMARATE-FA TAB 27-0.8 MG 1 TABLET: 27-0.8 TAB at 22:01

## 2018-01-04 RX ADMIN — SODIUM CHLORIDE 1000 ML: 900 INJECTION, SOLUTION INTRAVENOUS at 19:41

## 2018-01-04 RX ADMIN — LORAZEPAM 1 MG: 2 INJECTION INTRAMUSCULAR; INTRAVENOUS at 20:23

## 2018-01-04 RX ADMIN — SODIUM CHLORIDE 1000 ML: 9 INJECTION, SOLUTION INTRAVENOUS at 20:23

## 2018-01-04 ASSESSMENT — ENCOUNTER SYMPTOMS
HALLUCINATIONS: 1
DYSPHORIC MOOD: 1
CHEST TIGHTNESS: 0
APPETITE CHANGE: 0
ARTHRALGIAS: 0
SHORTNESS OF BREATH: 0
DYSURIA: 0
PALPITATIONS: 0
SORE THROAT: 0
ABDOMINAL DISTENTION: 0
BACK PAIN: 0
FATIGUE: 0
DIARRHEA: 0
CHILLS: 0
COUGH: 0
VOMITING: 1
FLANK PAIN: 0
HEADACHES: 0
WEAKNESS: 0
NAUSEA: 1

## 2018-01-04 NOTE — TELEPHONE ENCOUNTER
Records Received From: [facility]     Date/Exam/Location  (specify location if different)   ED/Hosp Notes: 6/2/17   Radiology Reports: - CT head brain 6/2/17  - CT cervical 6/2/17   Labs: 2017

## 2018-01-04 NOTE — ED AVS SNAPSHOT
[facility], [place], Emergency Department    [address] [place] [id number]    Phone:  [phone]                                       Billy Calzada   MRN: [id number]    Department:  [facility], Emergency Department   Date of Visit:  1/4/2018           After Visit Summary Signature Page     I have received my discharge instructions, and my questions have been answered. I have discussed any challenges I see with this plan with the nurse or doctor.    ..........................................................................................................................................  Patient/Patient Representative Signature      ..........................................................................................................................................  Patient Representative Print Name and Relationship to Patient    ..................................................               ................................................  Date                                            Time    ..........................................................................................................................................  Reviewed by Signature/Title    ...................................................              ..............................................  Date                                                            Time

## 2018-01-04 NOTE — ED AVS SNAPSHOT
[facility], Emergency Department    [address] [facility] [address]    Phone:  [phone]                                       Billy Calzada   MRN: [id number]    Department:  [facility], Emergency Department   Date of Visit:  1/4/2018           Patient Information     Date Of Birth          1960        Your diagnoses for this visit were:     Alcoholic intoxication without complication (H)        You were seen by Leon Jones MD.      Follow-up Information     Follow up with Leo Cardenas MD In 1 week.    Specialty:  Internal Medicine    Contact information:    [facility]  [address] [place] [address]  [phone]          Follow up with [facility], Emergency Department.    Specialty:  EMERGENCY MEDICINE    Why:  As needed, If symptoms worsen    Contact information:    [address] [place] [id number] [phone]    Additional information:    The [facility] is located on the corner of [facility] on the [facility]. It is easily accessible from virtually any point in the [facility] area, via [employer] and pbsi.        Discharge Instructions         Alcohol Intoxication  Alcohol intoxication occurs when you drink alcohol faster than your liver can remove it from your system. The following facts are important to remember:    It can take 10 minutes or more to start to feel the effects of a drink, so you can easily get more intoxicated than you intended.    One drink may be more than 1 serving of alcohol. Depending on the drink, it can be 2 to 4 servings.    It takes about an hour for your body to metabolize (clear) 1 serving. If you have more than 1 drink, it can take a couple of hours or more.    Many things affect how drinks will affect you, including whether you ve eaten, how fast you drink, your size, how much you normally drink (or not), medicines you take, chronic diseases you have,  "and gender.  Signs and symptoms of alcohol poisoning  The following are signs and symptoms of alcohol poisoning:  Mild impairment    Reduced inhibitions    Slurred speech    Drowsiness    Decreased fine motor skills  Moderate impairment    Erratic behavior, aggression, depression    Impaired judgment    Confusion    Concentration difficulties    Coordination problems  Severe impairment    Vomiting    Seizures    Unconsciousness    Cold, clammy    Slow or irregular breathing    Hypothermia (low body temperature)    Coma  Health effects  Alcohol abuse causes health problems. Sometimes this can happen after only drinking a  little.\" There is no set number of drinks or amount of alcohol that defines too much. The more you drink at one time, and the more frequently you drink determine both the short-term and long-term health effects. It affects all parts of your body and your health, including your:    Brain. Alcohol is a central nervous system depressant. It can damage parts of the brain that affect your balance, memory, thinking, and emotions. It can cause memory loss, blackouts, depression, agitation, sleep cycle changes, and seizures. These changes may or may not be reversible.    Heart and vascular system. Alcohol affects multiple areas. It can damage heart muscle causing cardiomyopathy, which is a weakening and stretching of the heart muscle. This can lead to trouble breathing, an irregular heartbeat, atrial fibrillation, leg swelling, and heart failure. It makes the blood vessels stiffen causing hypertension (high blood pressure). All of these problems increase your risk of having heart attacks or strokes.    Liver. Alcohol causes fat to build up in the liver, affecting its normal function. This increases the risk for hepatitis, leading to abdominal pain, appetite loss, jaundice, bleeding problems, liver fibrosis, and cirrhosis. This in turn can affect your ability to fight off infections, and can cause diabetes. " The liver changes prevent it from removing toxins in your blood that can cause encephalopathy. Signs of this are confusion, altered level of consciousness, personality changes, memory loss, seizures, coma, and death.    Pancreas. Alcohol can cause inflammation of the pancreas, or pancreatitis. This can cause pain in your abdomen, fever, and diabetes.    Immune system. Alcohol weakens your immune system in a number of ways. It suppresses your immune system making it harder to fight off infections and colds. You will also have a higher risk of certain infections like pneumonia and tuberculosis.    Cancer risk. Alcohol raises your risk of cancer of the mouth, esophagus, pharynx, larynx, liver, and breast.    Sexual function. Alcohol abuse can also lead to sexual problems.  Alcohol use during pregnancy may cause permanent damage to the growing baby.  Home care  The following guidelines will help you care for yourself at home:    Don't drink any more alcohol.    Don't drive until all effects of the alcohol have worn off.    Don't operate machinery that can cause injuries.    Get lots of rest over the next few days. Drink plenty of water and other non-alcoholic liquids. Try to eat regular meals.    If you have been drinking heavily on a daily basis, you may go through alcohol withdrawal. The usual symptoms last 3 to 4 days and may include nervousness, shakiness, nausea, sweating, sleeplessness, and can even cause seizures and a serious withdrawal symptom called delirium tremens, or DTs. During this time, it is best that you stay with family or friends who can help and support you. You can also admit yourself to a residential detox program. If your symptoms are severe (seizures, severe shakiness, confusion), contact your doctor or call an ambulance for help (see below).   Follow-up care  If alcohol is a problem in your life, these are some organizations that can help you:    Alcoholics Anonymous offers support through a  self-help fellowship. There are no dues or fees. See the Yellow Pages and call for time and place of meetings. Find AA online at www.aa.org.    [employer] offers support to families of alcohol users. Contact 580-776-8126, or online at www.al-jennifer.org.    National [place] on Alcoholism and Drug Dependence can be reached at 001-457-0404, or online at www.ncadd.org.    There are also inpatient and residential alcohol detox programs. Check the Internet or phonebook Yellow Pages under  Drug Abuse and Treatment Centers.   Call 911  Call 911 if any of these occur:    Trouble breathing or slow irregular breathing    Chest pain    Sudden weakness on one side of your body or sudden trouble speaking    Heavy bleeding or vomiting blood    Very drowsy or trouble awakening    Fainting or loss of consciousness    Rapid heart rate    Seizure  When to seek medical advice  Call your healthcare provider right away if any of these occur:    Severe shakiness     Fever over 100.4  F (38.0  C)    Confusion or hallucinations (seeing, hearing, or feeling things that are not there)    Pain in your upper abdomen that gets worse    Repeated vomiting  Date Last Reviewed: 6/1/2016 2000-2017 [employer]. [address] [place] [address]. All rights reserved. This information is not intended as a substitute for professional medical care. Always follow your healthcare professional's instructions.          Future Appointments        Provider Department Dept Phone Center    1/11/2018 8:00 AM Chase Rivero MD [facility] Neurology [phone] [facility]      24 Hour Appointment Hotline       To make an appointment at any [facility], call [phone] ([phone]). If you don't have a family doctor or clinic, we will help you find one. [place] clinics are conveniently located to serve the needs of you and your family.             Review of your medicines      Our records show that you are taking the medicines listed  below. If these are incorrect, please call your family doctor or clinic.        Dose / Directions Last dose taken    atenolol 100 MG tablet   Commonly known as:  TENORMIN   Dose:  100 mg        Take 100 mg by mouth daily.   Refills:  0        LORazepam 2 MG tablet   Commonly known as:  ATIVAN   Dose:  2 mg        Take 2 mg by mouth 2 times daily as needed.   Refills:  0        MULTIVITAL PO        Take  by mouth.   Refills:  0        simvastatin 20 MG tablet   Commonly known as:  ZOCOR   Dose:  20 mg        Take 20 mg by mouth At Bedtime.   Refills:  0                Procedures and tests performed during your visit     Procedure/Test Number of Times Performed    Alcohol breath test POCT 2    CBC with platelets differential 1    Comprehensive metabolic panel 1    Magnesium 1      Orders Needing Specimen Collection     Ordered          01/04/18 1913  Drug abuse screen 6 urine (chem dep) - STAT, [facility]: STAT, Needs to be Collected     Scheduled Task Status   01/04/18 1914 Collect Drug abuse screen 6 urine (chem dep) Open   Order Class:  PCU Collect                  Pending Results     No orders found for last 3 day(s).            Pending Culture Results     No orders found for last 3 day(s).            Pending Results Instructions     If you had any lab results that were not finalized at the time of your Discharge, you can call the ED Lab Result RN at [phone]. You will be contacted by this team for any positive Lab results or changes in treatment. The nurses are available 7 days a week from 10A to 6:30P.  You can leave a message 24 hours per day and they will return your call.        Thank you for choosing [facility]       Thank you for choosing [facility] for your care. Our goal is always to provide you with excellent care. Hearing back from our patients is one way we can continue to improve our services. Please take a few minutes to complete the written survey that you may receive in the mail after you visit with us.  "Thank you!        [employer] Information     [employer] lets you send messages to your doctor, view your test results, renew your prescriptions, schedule appointments and more. To sign up, go to [email or web address] . Click on \"Log in\" on the left side of the screen, which will take you to the Welcome page. Then click on \"Sign up Now\" on the right side of the page.     You will be asked to enter the access code listed below, as well as some personal information. Please follow the directions to create your username and password.     Your access code is: [email or web address]  Expires: 3/28/2018  6:31 AM     Your access code will  in 90 days. If you need help or a new code, please call your [facility] clinic or [phone].        Care EveryWhere ID     This is your Care EveryWhere ID. This could be used by other organizations to access your [facility] medical records  [id number]        Equal Access to Services     [facility] : Hadtianna Wright, waaxda lusherri, qaybta kaalmaalex yeh, josé santiago . So [facility] [phone].    ATENCIÓN: Si habla español, tiene a joshi disposición servicios gratuitos de asistencia lingüística. Llame al [phone].    We comply with applicable federal civil rights laws and [place] laws. We do not discriminate on the basis of race, color, national origin, age, disability, sex, sexual orientation, or gender identity.            After Visit Summary       This is your record. Keep this with you and show to your community pharmacist(s) and doctor(s) at your next visit.                  "

## 2018-01-05 VITALS
OXYGEN SATURATION: 94 % | RESPIRATION RATE: 10 BRPM | SYSTOLIC BLOOD PRESSURE: 131 MMHG | TEMPERATURE: 97.2 F | HEART RATE: 62 BPM | DIASTOLIC BLOOD PRESSURE: 68 MMHG

## 2018-01-05 LAB — ALCOHOL BREATH TEST: 0.26 (ref 0–0.01)

## 2018-01-05 NOTE — ED NOTES
Presented to restaurant asking for ambulance, stopped drinking this am, experiencing hallucinations seeing his diseased mother, feels depressed .    Wants to quit etoh but doesn't want to go to facility

## 2018-01-05 NOTE — ED PROVIDER NOTES
The patient was accepted at shift change signout with a plan to monitor him in the ER until sober, then discharge home.  He is clinically sober at this point, thus will be discharged home per plan.  There were no acute events overnight or new complaints this morning.    Dictation Disclaimer: Some of this Note has been completed with voice-recognition dictation software. Although errors are generally corrected real-time, there is the potential for a rare error to be present in the completed chart.       Obdulia Linares MD  01/05/18 0661

## 2018-01-05 NOTE — DISCHARGE INSTRUCTIONS
"  Alcohol Intoxication  Alcohol intoxication occurs when you drink alcohol faster than your liver can remove it from your system. The following facts are important to remember:    It can take 10 minutes or more to start to feel the effects of a drink, so you can easily get more intoxicated than you intended.    One drink may be more than 1 serving of alcohol. Depending on the drink, it can be 2 to 4 servings.    It takes about an hour for your body to metabolize (clear) 1 serving. If you have more than 1 drink, it can take a couple of hours or more.    Many things affect how drinks will affect you, including whether you ve eaten, how fast you drink, your size, how much you normally drink (or not), medicines you take, chronic diseases you have, and gender.  Signs and symptoms of alcohol poisoning  The following are signs and symptoms of alcohol poisoning:  Mild impairment    Reduced inhibitions    Slurred speech    Drowsiness    Decreased fine motor skills  Moderate impairment    Erratic behavior, aggression, depression    Impaired judgment    Confusion    Concentration difficulties    Coordination problems  Severe impairment    Vomiting    Seizures    Unconsciousness    Cold, clammy    Slow or irregular breathing    Hypothermia (low body temperature)    Coma  Health effects  Alcohol abuse causes health problems. Sometimes this can happen after only drinking a  little.\" There is no set number of drinks or amount of alcohol that defines too much. The more you drink at one time, and the more frequently you drink determine both the short-term and long-term health effects. It affects all parts of your body and your health, including your:    Brain. Alcohol is a central nervous system depressant. It can damage parts of the brain that affect your balance, memory, thinking, and emotions. It can cause memory loss, blackouts, depression, agitation, sleep cycle changes, and seizures. These changes may or may not be " reversible.    Heart and vascular system. Alcohol affects multiple areas. It can damage heart muscle causing cardiomyopathy, which is a weakening and stretching of the heart muscle. This can lead to trouble breathing, an irregular heartbeat, atrial fibrillation, leg swelling, and heart failure. It makes the blood vessels stiffen causing hypertension (high blood pressure). All of these problems increase your risk of having heart attacks or strokes.    Liver. Alcohol causes fat to build up in the liver, affecting its normal function. This increases the risk for hepatitis, leading to abdominal pain, appetite loss, jaundice, bleeding problems, liver fibrosis, and cirrhosis. This in turn can affect your ability to fight off infections, and can cause diabetes. The liver changes prevent it from removing toxins in your blood that can cause encephalopathy. Signs of this are confusion, altered level of consciousness, personality changes, memory loss, seizures, coma, and death.    Pancreas. Alcohol can cause inflammation of the pancreas, or pancreatitis. This can cause pain in your abdomen, fever, and diabetes.    Immune system. Alcohol weakens your immune system in a number of ways. It suppresses your immune system making it harder to fight off infections and colds. You will also have a higher risk of certain infections like pneumonia and tuberculosis.    Cancer risk. Alcohol raises your risk of cancer of the mouth, esophagus, pharynx, larynx, liver, and breast.    Sexual function. Alcohol abuse can also lead to sexual problems.  Alcohol use during pregnancy may cause permanent damage to the growing baby.  Home care  The following guidelines will help you care for yourself at home:    Don't drink any more alcohol.    Don't drive until all effects of the alcohol have worn off.    Don't operate machinery that can cause injuries.    Get lots of rest over the next few days. Drink plenty of water and other non-alcoholic liquids.  Try to eat regular meals.    If you have been drinking heavily on a daily basis, you may go through alcohol withdrawal. The usual symptoms last 3 to 4 days and may include nervousness, shakiness, nausea, sweating, sleeplessness, and can even cause seizures and a serious withdrawal symptom called delirium tremens, or DTs. During this time, it is best that you stay with family or friends who can help and support you. You can also admit yourself to a residential detox program. If your symptoms are severe (seizures, severe shakiness, confusion), contact your doctor or call an ambulance for help (see below).   Follow-up care  If alcohol is a problem in your life, these are some organizations that can help you:    Alcoholics Anonymous offers support through a self-help fellowship. There are no dues or fees. See the Yellow Pages and call for time and place of meetings. Find AA online at www.aa.org.    [employer] offers support to families of alcohol users. Contact 858-385-1645, or online at www.al-anodelaeny.org.    National [place] on Alcoholism and Drug Dependence can be reached at 261-334-9428, or online at www.ncadd.org.    There are also inpatient and residential alcohol detox programs. Check the Internet or phonebook Yellow Pages under  Drug Abuse and Treatment Centers.   Call 911  Call 911 if any of these occur:    Trouble breathing or slow irregular breathing    Chest pain    Sudden weakness on one side of your body or sudden trouble speaking    Heavy bleeding or vomiting blood    Very drowsy or trouble awakening    Fainting or loss of consciousness    Rapid heart rate    Seizure  When to seek medical advice  Call your healthcare provider right away if any of these occur:    Severe shakiness     Fever over 100.4  F (38.0  C)    Confusion or hallucinations (seeing, hearing, or feeling things that are not there)    Pain in your upper abdomen that gets worse    Repeated vomiting  Date Last Reviewed: 6/1/2016 2000-2017 [employer]. [address] [place] [address], [place] [address]. All rights reserved. This information is not intended as a substitute for professional medical care. Always follow your healthcare professional's instructions.

## 2018-01-05 NOTE — ED PROVIDER NOTES
History     Chief Complaint   Patient presents with     Alcohol Problem     HPI  57-year-old male with history of alcohol misuse disorder and prior accidental benzodiazepine overdose arriving today to the emergency department via ambulance for evaluation of possible alcohol withdrawal symptoms.  The patient states over the past week he has been drinking greater than 1 L of vodka per day.  He states he attempted this quit last night around 12 hours ago.  Patient states he developed nausea and several episodes of emesis today.  The patient does state that he has been seeing things including his mother who is not there.  He denies fall or trauma.  He denies headache.  He denies other intentional use of prescription medications or illicit substances.  The patient at this time states he feels dehydrated but adamantly denies my offer for detox.  Patient otherwise reports no active medical problems.  He reports no recent illness including fever, chills, diarrhea, melena, hematochezia.  At this time he is a poor historian he states he has been to a prior rehab facility but does not recall the name.  He denies other concern at this time.    I have reviewed the Medications, Allergies, Past Medical and Surgical History, and Social History in the Epic system.    Review of Systems   Constitutional: Negative for appetite change, chills and fatigue.   HENT: Negative for sore throat.    Respiratory: Negative for cough, chest tightness and shortness of breath.    Cardiovascular: Negative for chest pain and palpitations.   Gastrointestinal: Positive for nausea and vomiting. Negative for abdominal distention and diarrhea.   Genitourinary: Negative for dysuria and flank pain.   Musculoskeletal: Negative for arthralgias and back pain.   Neurological: Negative for weakness and headaches.   Psychiatric/Behavioral: Positive for dysphoric mood and hallucinations. Negative for self-injury and suicidal ideas.   All other systems reviewed  and are negative.      Physical Exam   BP: 135/84  Heart Rate: 72  Resp: 16      Physical Exam   Constitutional: He is oriented to person, place, and time. He appears well-nourished.   HENT:   Head: Normocephalic and atraumatic.   Mouth/Throat: Oropharynx is clear and moist.   Eyes: Conjunctivae are normal. Pupils are equal, round, and reactive to light.   Neck: Normal range of motion.   Cardiovascular: Normal rate, regular rhythm and normal heart sounds.    Pulmonary/Chest: Effort normal and breath sounds normal. No respiratory distress. He has no wheezes. He has no rales.   Abdominal: Soft. He exhibits no distension. There is no tenderness. There is no rebound and no guarding.   Musculoskeletal: He exhibits no edema, tenderness or deformity.   Neurological: He is alert and oriented to person, place, and time. He has normal strength. No cranial nerve deficit or sensory deficit. GCS eye subscore is 4. GCS verbal subscore is 5. GCS motor subscore is 6.   Skin: Skin is warm and dry. No rash noted. He is not diaphoretic.   Psychiatric: His speech is slurred. He expresses no suicidal ideation.       ED Course     ED Course     Procedures             Labs Ordered and Resulted from Time of ED Arrival Up to the Time of Departure from the ED - No data to display         Assessments & Plan (with Medical Decision Making)     57-year-old male with history of alcohol misuse disorder arriving via EMS due to concern of recent heavy alcohol use and need for detoxification.  On my evaluation the patient noted to be alert he is currently afebrile and hemodynamically stable.  Speech is slurred and appears somewhat clinically intoxicated although states he has not drank in greater than 12 hours.  He has no signs of external trauma to the head or neck and at this time I do not believe he requires CT scan.  He denies active medical problems however with recent heavy alcohol use I would plan to obtain CBC and CMP.  We have initiated IV  access with IV fluids and initial dose of Ativan.  At this time his CIWA score is low.  I discussed with the patient that his primary concern was for withdrawal type symptoms and I am concerned that he is refusing detox treatment.  There was concern about the patient possibly having loss of bladder continence and it is unclear if the patient urinated secondary to heavy alcohol intoxication or possible seizure.  Reports no previous diagnosis of seizure disorder or epilepsy.    Operatory studies demonstrate a slightly elevated magnesium level without significant electrolyte abnormality.  The patient was given a prenatal multivitamin in the emergency department and fluid rehydrated.  Alcohol breathalyzer upon arrival 0.297.  Ultimately the patient was observed in the emergency department until clinically sober.  I again did offer and stress importance of alcohol detox goal of rehabilitation with the patient has declined.  At this time the patient does have capacity to make this decision.  I did however contact 4 separate detox facilities in the [facility] all of which do not have beds.  At this time the patient remains significantly intoxicated with a repeat breath alcohol level of 0.258 after 2L IVF and 5 hrs of time.   As he will be utilized public transportation, he will require a further period of observation to sober prior to dismissal.      I have reviewed the nursing notes.    I have reviewed the findings, diagnosis, plan and need for follow up with the patient.    New Prescriptions    No medications on file       Final diagnoses:   Alcoholic intoxication without complication (H)       1/4/2018   [facility], [place], EMERGENCY DEPARTMENT     Leon Jones MD  01/05/18 0041       Leon Jones MD  01/05/18 0045

## 2018-01-11 ENCOUNTER — PRE VISIT (OUTPATIENT)
Dept: NEUROLOGY | Facility: CLINIC | Age: 58
End: 2018-01-11

## 2018-02-02 ENCOUNTER — HOSPITAL ENCOUNTER (EMERGENCY)
Facility: CLINIC | Age: 58
Discharge: HOME | End: 2018-02-02
Attending: EMERGENCY MEDICINE | Admitting: EMERGENCY MEDICINE
Payer: COMMERCIAL

## 2018-02-02 VITALS
BODY MASS INDEX: 25.04 KG/M2 | HEART RATE: 77 BPM | TEMPERATURE: 95.7 F | SYSTOLIC BLOOD PRESSURE: 103 MMHG | OXYGEN SATURATION: 99 % | DIASTOLIC BLOOD PRESSURE: 72 MMHG | WEIGHT: 195 LBS | RESPIRATION RATE: 16 BRPM

## 2018-02-02 DIAGNOSIS — Z76.0 ENCOUNTER FOR MEDICATION REFILL: ICD-10-CM

## 2018-02-02 DIAGNOSIS — F10.920 ALCOHOL INTOXICATION, UNCOMPLICATED (H): ICD-10-CM

## 2018-02-02 DIAGNOSIS — Z76.0 ISSUE OF REPEAT PRESCRIPTION: ICD-10-CM

## 2018-02-02 LAB — ALCOHOL BREATH TEST: 0.23 (ref 0–0.01)

## 2018-02-02 PROCEDURE — 82075 ASSAY OF BREATH ETHANOL: CPT | Performed by: EMERGENCY MEDICINE

## 2018-02-02 PROCEDURE — 99283 EMERGENCY DEPT VISIT LOW MDM: CPT | Performed by: EMERGENCY MEDICINE

## 2018-02-02 PROCEDURE — 99284 EMERGENCY DEPT VISIT MOD MDM: CPT | Mod: Z6 | Performed by: EMERGENCY MEDICINE

## 2018-02-02 RX ORDER — ATENOLOL 50 MG/1
100 TABLET ORAL DAILY
Qty: 6 TABLET | Refills: 0 | Status: SHIPPED | OUTPATIENT
Start: 2018-02-02

## 2018-02-02 RX ORDER — ZOLPIDEM TARTRATE 10 MG/1
10 TABLET ORAL
Qty: 10 TABLET | Refills: 0 | Status: SHIPPED | OUTPATIENT
Start: 2018-02-02

## 2018-02-02 ASSESSMENT — ENCOUNTER SYMPTOMS
DIARRHEA: 0
VOMITING: 0
CHILLS: 0
HALLUCINATIONS: 0
FEVER: 0
NAUSEA: 0
COUGH: 0
ABDOMINAL PAIN: 0
SHORTNESS OF BREATH: 0

## 2018-02-02 NOTE — ED AVS SNAPSHOT
[facility], Emergency Department    [address]    [place] [address]    Phone:  [phone]    Fax:  [phone]                                       Billy Calzada   MRN: [id number]    Department:  [facility], Emergency Department   Date of Visit:  2/2/2018           Patient Information     Date Of Birth          1960        Your diagnoses for this visit were:     Alcohol intoxication, uncomplicated (H)     Encounter for medication refill        You were seen by Grace Knight MD.        Discharge Instructions         Alcohol Intoxication  Alcohol intoxication occurs when you drink alcohol faster than your liver can remove it from your system. The following facts are important to remember:    It can take 10 minutes or more to start to feel the effects of a drink, so you can easily get more intoxicated than you intended.    One drink may be more than 1 serving of alcohol. Depending on the drink, it can be 2 to 4 servings.    It takes about an hour for your body to metabolize (clear) 1 serving. If you have more than 1 drink, it can take a couple of hours or more.    Many things affect how drinks will affect you, including whether you ve eaten, how fast you drink, your size, how much you normally drink (or not), medicines you take, chronic diseases you have, and gender.  Signs and symptoms of alcohol poisoning  The following are signs and symptoms of alcohol poisoning:  Mild impairment    Reduced inhibitions    Slurred speech    Drowsiness    Decreased fine motor skills  Moderate impairment    Erratic behavior, aggression, depression    Impaired judgment    Confusion    Concentration difficulties    Coordination problems  Severe impairment    Vomiting    Seizures    Unconsciousness    Cold, clammy    Slow or irregular breathing    Hypothermia (low body temperature)    Coma  Health effects  Alcohol abuse causes health problems. Sometimes this can happen after only drinking a  " little.\" There is no set number of drinks or amount of alcohol that defines too much. The more you drink at one time, and the more frequently you drink determine both the short-term and long-term health effects. It affects all parts of your body and your health, including your:    Brain. Alcohol is a central nervous system depressant. It can damage parts of the brain that affect your balance, memory, thinking, and emotions. It can cause memory loss, blackouts, depression, agitation, sleep cycle changes, and seizures. These changes may or may not be reversible.    Heart and vascular system. Alcohol affects multiple areas. It can damage heart muscle causing cardiomyopathy, which is a weakening and stretching of the heart muscle. This can lead to trouble breathing, an irregular heartbeat, atrial fibrillation, leg swelling, and heart failure. It makes the blood vessels stiffen causing hypertension (high blood pressure). All of these problems increase your risk of having heart attacks or strokes.    Liver. Alcohol causes fat to build up in the liver, affecting its normal function. This increases the risk for hepatitis, leading to abdominal pain, appetite loss, jaundice, bleeding problems, liver fibrosis, and cirrhosis. This in turn can affect your ability to fight off infections, and can cause diabetes. The liver changes prevent it from removing toxins in your blood that can cause encephalopathy. Signs of this are confusion, altered level of consciousness, personality changes, memory loss, seizures, coma, and death.    Pancreas. Alcohol can cause inflammation of the pancreas, or pancreatitis. This can cause pain in your abdomen, fever, and diabetes.    Immune system. Alcohol weakens your immune system in a number of ways. It suppresses your immune system making it harder to fight off infections and colds. You will also have a higher risk of certain infections like pneumonia and tuberculosis.    Cancer risk. Alcohol " raises your risk of cancer of the mouth, esophagus, pharynx, larynx, liver, and breast.    Sexual function. Alcohol abuse can also lead to sexual problems.  Alcohol use during pregnancy may cause permanent damage to the growing baby.  Home care  The following guidelines will help you care for yourself at home:    Don't drink any more alcohol.    Don't drive until all effects of the alcohol have worn off.    Don't operate machinery that can cause injuries.    Get lots of rest over the next few days. Drink plenty of water and other non-alcoholic liquids. Try to eat regular meals.    If you have been drinking heavily on a daily basis, you may go through alcohol withdrawal. The usual symptoms last 3 to 4 days and may include nervousness, shakiness, nausea, sweating, sleeplessness, and can even cause seizures and a serious withdrawal symptom called delirium tremens, or DTs. During this time, it is best that you stay with family or friends who can help and support you. You can also admit yourself to a residential detox program. If your symptoms are severe (seizures, severe shakiness, confusion), contact your doctor or call an ambulance for help (see below).   Follow-up care  If alcohol is a problem in your life, these are some organizations that can help you:    Alcoholics Anonymous offers support through a self-help fellowship. There are no dues or fees. See the Yellow Pages and call for time and place of meetings. Find AA online at www.aa.org.    [employer] offers support to families of alcohol users. Contact 081-958-4087, or online at www.al-anodelaney.org.    National [place] on Alcoholism and Drug Dependence can be reached at 580-621-5559, or online at www.ncadd.org.    There are also inpatient and residential alcohol detox programs. Check the Internet or phonebook Yellow Pages under  Drug Abuse and Treatment Centers.   Call 911  Call 911 if any of these occur:    Trouble breathing or slow irregular breathing    Chest  pain    Sudden weakness on one side of your body or sudden trouble speaking    Heavy bleeding or vomiting blood    Very drowsy or trouble awakening    Fainting or loss of consciousness    Rapid heart rate    Seizure  When to seek medical advice  Call your healthcare provider right away if any of these occur:    Severe shakiness     Fever over 100.4  F (38.0  C)    Confusion or hallucinations (seeing, hearing, or feeling things that are not there)    Pain in your upper abdomen that gets worse    Repeated vomiting  Date Last Reviewed: 6/1/2016 2000-2017 [employer]. [address] [place] [address]. All rights reserved. This information is not intended as a substitute for professional medical care. Always follow your healthcare professional's instructions.      Please make an appointment to follow up with Your Primary Care Provider in 3 days for continued care.      24 Hour Appointment Hotline       To make an appointment at any [facility], call [phone] ([phone]). If you don't have a family doctor or clinic, we will help you find one. [facility] clinics are conveniently located to serve the needs of you and your family.             Review of your medicines      START taking        Dose / Directions Last dose taken    zolpidem 10 MG tablet   Commonly known as:  AMBIEN   Dose:  10 mg   Quantity:  10 tablet        Take 1 tablet (10 mg) by mouth nightly as needed for sleep   Refills:  0          CONTINUE these medicines which may have CHANGED, or have new prescriptions. If we are uncertain of the size of tablets/capsules you have at home, strength may be listed as something that might have changed.        Dose / Directions Last dose taken    * atenolol 50 MG tablet   Commonly known as:  TENORMIN   Dose:  100 mg   What changed:  You were already taking a medication with the same name, and this prescription was added. Make sure you understand how and when to take each.   Quantity:   6 tablet        Take 2 tablets (100 mg) by mouth daily   Refills:  0        * atenolol 100 MG tablet   Commonly known as:  TENORMIN   Dose:  100 mg   What changed:  Another medication with the same name was added. Make sure you understand how and when to take each.        Take 100 mg by mouth daily.   Refills:  0        * Notice:  This list has 2 medication(s) that are the same as other medications prescribed for you. Read the directions carefully, and ask your doctor or other care provider to review them with you.      Our records show that you are taking the medicines listed below. If these are incorrect, please call your family doctor or clinic.        Dose / Directions Last dose taken    LORazepam 2 MG tablet   Commonly known as:  ATIVAN   Dose:  2 mg        Take 2 mg by mouth 2 times daily as needed.   Refills:  0        MULTIVITAL PO        Take  by mouth.   Refills:  0        simvastatin 20 MG tablet   Commonly known as:  ZOCOR   Dose:  20 mg        Take 20 mg by mouth At Bedtime.   Refills:  0                Prescriptions were sent or printed at these locations (2 Prescriptions)                   Other Prescriptions                Printed at Department/Unit printer (2 of 2)         zolpidem (AMBIEN) 10 MG tablet               atenolol (TENORMIN) 50 MG tablet                Procedures and tests performed during your visit     Alcohol breath test POCT      Orders Needing Specimen Collection     None      Pending Results     No orders found from 1/31/2018 to 2/3/2018.            Pending Culture Results     No orders found from 1/31/2018 to 2/3/2018.            Pending Results Instructions     If you had any lab results that were not finalized at the time of your Discharge, you can call the ED Lab Result RN at [phone]. You will be contacted by this team for any positive Lab results or changes in treatment. The nurses are available 7 days a week from 10A to 6:30P.  You can leave a message 24 hours per day  "and they will return your call.        Thank you for choosing [facility]       Thank you for choosing [facility] for your care. Our goal is always to provide you with excellent care. Hearing back from our patients is one way we can continue to improve our services. Please take a few minutes to complete the written survey that you may receive in the mail after you visit with us. Thank you!        [employer] Information     [employer] lets you send messages to your doctor, view your test results, renew your prescriptions, schedule appointments and more. To sign up, go to [email or web address] . Click on \"Log in\" on the left side of the screen, which will take you to the Welcome page. Then click on \"Sign up Now\" on the right side of the page.     You will be asked to enter the access code listed below, as well as some personal information. Please follow the directions to create your username and password.     Your access code is: [email or web address]  Expires: 3/28/2018  6:31 AM     Your access code will  in 90 days. If you need help or a new code, please call your [facility] clinic or [phone].        Care EveryWhere ID     This is your Care EveryWhere ID. This could be used by other organizations to access your [facility] medical records  [id number]        Equal Access to Services     MANUEL QUIÑONES : Chio Wright, waaxda luqadaha, qaybta kaalmada adeclementyada, josé ward. So [facility] [phone].    ATENCIÓN: Si habla español, tiene a joshi disposición servicios gratuitos de asistencia lingüística. Llame al [phone].    We comply with applicable federal civil rights laws and [place] laws. We do not discriminate on the basis of race, color, national origin, age, disability, sex, sexual orientation, or gender identity.            After Visit Summary       This is your record. Keep this with you and show to your community pharmacist(s) and doctor(s) at your next visit.                "

## 2018-02-02 NOTE — ED NOTES
Bed: ED12  Expected date: 2/2/18  Expected time: 11:40 AM  Means of arrival:   Comments:  [id number]  56yo M etoh

## 2018-02-02 NOTE — DISCHARGE INSTRUCTIONS
"  Alcohol Intoxication  Alcohol intoxication occurs when you drink alcohol faster than your liver can remove it from your system. The following facts are important to remember:    It can take 10 minutes or more to start to feel the effects of a drink, so you can easily get more intoxicated than you intended.    One drink may be more than 1 serving of alcohol. Depending on the drink, it can be 2 to 4 servings.    It takes about an hour for your body to metabolize (clear) 1 serving. If you have more than 1 drink, it can take a couple of hours or more.    Many things affect how drinks will affect you, including whether you ve eaten, how fast you drink, your size, how much you normally drink (or not), medicines you take, chronic diseases you have, and gender.  Signs and symptoms of alcohol poisoning  The following are signs and symptoms of alcohol poisoning:  Mild impairment    Reduced inhibitions    Slurred speech    Drowsiness    Decreased fine motor skills  Moderate impairment    Erratic behavior, aggression, depression    Impaired judgment    Confusion    Concentration difficulties    Coordination problems  Severe impairment    Vomiting    Seizures    Unconsciousness    Cold, clammy    Slow or irregular breathing    Hypothermia (low body temperature)    Coma  Health effects  Alcohol abuse causes health problems. Sometimes this can happen after only drinking a  little.\" There is no set number of drinks or amount of alcohol that defines too much. The more you drink at one time, and the more frequently you drink determine both the short-term and long-term health effects. It affects all parts of your body and your health, including your:    Brain. Alcohol is a central nervous system depressant. It can damage parts of the brain that affect your balance, memory, thinking, and emotions. It can cause memory loss, blackouts, depression, agitation, sleep cycle changes, and seizures. These changes may or may not be " reversible.    Heart and vascular system. Alcohol affects multiple areas. It can damage heart muscle causing cardiomyopathy, which is a weakening and stretching of the heart muscle. This can lead to trouble breathing, an irregular heartbeat, atrial fibrillation, leg swelling, and heart failure. It makes the blood vessels stiffen causing hypertension (high blood pressure). All of these problems increase your risk of having heart attacks or strokes.    Liver. Alcohol causes fat to build up in the liver, affecting its normal function. This increases the risk for hepatitis, leading to abdominal pain, appetite loss, jaundice, bleeding problems, liver fibrosis, and cirrhosis. This in turn can affect your ability to fight off infections, and can cause diabetes. The liver changes prevent it from removing toxins in your blood that can cause encephalopathy. Signs of this are confusion, altered level of consciousness, personality changes, memory loss, seizures, coma, and death.    Pancreas. Alcohol can cause inflammation of the pancreas, or pancreatitis. This can cause pain in your abdomen, fever, and diabetes.    Immune system. Alcohol weakens your immune system in a number of ways. It suppresses your immune system making it harder to fight off infections and colds. You will also have a higher risk of certain infections like pneumonia and tuberculosis.    Cancer risk. Alcohol raises your risk of cancer of the mouth, esophagus, pharynx, larynx, liver, and breast.    Sexual function. Alcohol abuse can also lead to sexual problems.  Alcohol use during pregnancy may cause permanent damage to the growing baby.  Home care  The following guidelines will help you care for yourself at home:    Don't drink any more alcohol.    Don't drive until all effects of the alcohol have worn off.    Don't operate machinery that can cause injuries.    Get lots of rest over the next few days. Drink plenty of water and other non-alcoholic liquids.  Try to eat regular meals.    If you have been drinking heavily on a daily basis, you may go through alcohol withdrawal. The usual symptoms last 3 to 4 days and may include nervousness, shakiness, nausea, sweating, sleeplessness, and can even cause seizures and a serious withdrawal symptom called delirium tremens, or DTs. During this time, it is best that you stay with family or friends who can help and support you. You can also admit yourself to a residential detox program. If your symptoms are severe (seizures, severe shakiness, confusion), contact your doctor or call an ambulance for help (see below).   Follow-up care  If alcohol is a problem in your life, these are some organizations that can help you:    Alcoholics Anonymous offers support through a self-help fellowship. There are no dues or fees. See the Yellow Pages and call for time and place of meetings. Find AA online at www.aa.org.    [employer] offers support to families of alcohol users. Contact 923-681-7846, or online at www.al-anodelaney.org.    National [place] on Alcoholism and Drug Dependence can be reached at 416-524-7768, or online at www.ncadd.org.    There are also inpatient and residential alcohol detox programs. Check the Internet or phonebook Yellow Pages under  Drug Abuse and Treatment Centers.   Call 911  Call 911 if any of these occur:    Trouble breathing or slow irregular breathing    Chest pain    Sudden weakness on one side of your body or sudden trouble speaking    Heavy bleeding or vomiting blood    Very drowsy or trouble awakening    Fainting or loss of consciousness    Rapid heart rate    Seizure  When to seek medical advice  Call your healthcare provider right away if any of these occur:    Severe shakiness     Fever over 100.4  F (38.0  C)    Confusion or hallucinations (seeing, hearing, or feeling things that are not there)    Pain in your upper abdomen that gets worse    Repeated vomiting  Date Last Reviewed: 6/1/2016 2000-2017 The  [employer]. [address] [place] [address], [place] [address]. All rights reserved. This information is not intended as a substitute for professional medical care. Always follow your healthcare professional's instructions.      Please make an appointment to follow up with Your Primary Care Provider in 3 days for continued care.

## 2018-02-02 NOTE — ED AVS SNAPSHOT
[facility], [place], Emergency Department    [address]    [place] [address]    Phone:  [phone]    Fax:  [phone]                                       Billy Calzada   MRN: [id number]    Department:  [facility], Emergency Department   Date of Visit:  2/2/2018           After Visit Summary Signature Page     I have received my discharge instructions, and my questions have been answered. I have discussed any challenges I see with this plan with the nurse or doctor.    ..........................................................................................................................................  Patient/Patient Representative Signature      ..........................................................................................................................................  Patient Representative Print Name and Relationship to Patient    ..................................................               ................................................  Date                                            Time    ..........................................................................................................................................  Reviewed by Signature/Title    ...................................................              ..............................................  Date                                                            Time

## 2018-02-02 NOTE — ED PROVIDER NOTES
History     Chief Complaint   Patient presents with     Alcohol Intoxication     pt was brought in by EMS from clinic d/t intoxication     HPI  Billy Calzada is a 57 year old male with a history of anxiety, hypertension, hyperlipidemia, and alcohol abuse who presents from clinic for evaluation of alcohol intoxication. Patient reports he went into his primary clinic this morning to get a refill on his Ambien prescription as he had run out and was having difficulty sleeping. He states when he arrived at his clinic, clinic staff noticed he was intoxicated, so called EMS who transferred the patient here for further evaluation. Here, patient reports that he only drinks 2-3 times per month on average, but that when he does drink he binges. He denies feeling dependent on alcohol, and states this morning was the first time he had drank alcohol is several months. He started drinking around 10 AM this morning, and reports it is not unusual for him to drink in the morning/during the day. He has gone through alcohol withdrawal/detox in the past (2009). He denies substance abuse. No SI or HI. No auditory or visual hallucinations. He denies fever, cough, abdominal pain, nausea, or vomiting. Currently, his only complaint is that his clinic would not refill his Ambien prescription and he would like this medication to help him sleep. He states he would like to go home and can contract for his own safety, but does not have a sober friend or family member to come pick him up from the Emergency Department at this time.       I have reviewed the Medications, Allergies, Past Medical and Surgical History, and Social History in the [employer] system.  Past Medical History:   Diagnosis Date     Alcohol abuse      Anxiety      Hyperlipidemia      Hypertension        History reviewed. No pertinent surgical history.    Family History   Problem Relation Age of Onset     Tremor Son      Tremor Son        Social History   Substance Use Topics      Smoking status: Never Smoker     Smokeless tobacco: Never Used     Alcohol use 1.2 oz/week     2 Cans of beer per week       No current facility-administered medications for this encounter.      Current Outpatient Prescriptions   Medication     zolpidem (AMBIEN) 10 MG tablet     atenolol (TENORMIN) 50 MG tablet     Multiple Vitamins-Minerals (MULTIVITAL PO)     lorazepam (ATIVAN) 2 MG tablet     simvastatin (ZOCOR) 20 MG tablet     atenolol (TENORMIN) 100 MG tablet        Allergies   Allergen Reactions     Iodine I 131 Tositumomab      Keflex [Cephalexin-Fd&C Yellow #6]        Review of Systems   Constitutional: Negative for chills and fever.   Respiratory: Negative for cough and shortness of breath.    Cardiovascular: Negative for chest pain.   Gastrointestinal: Negative for abdominal pain, diarrhea, nausea and vomiting.   Psychiatric/Behavioral: Negative for hallucinations and suicidal ideas.   All other systems reviewed and are negative.      Physical Exam   BP: 97/67  Pulse: 65  Temp: 95.9  F (35.5  C)  Resp: 16  Weight: 88.5 kg (195 lb)  SpO2: 97 %      Physical Exam  GEN:  Alert, well developed, no acute distress.  Is clinically intoxicated with slurred speech.  HEENT:  PERRL, EOMI, Mucous membranes are moist.   Cardio:  RRR, no murmur, radial pulses equal bilaterally  PULM:  Lungs clear, good air movement, no wheezes, rales   Abd:  Soft, normal bowel sounds, no focal tenderness  Back exam:  No CVA tenderness  Musculoskeletal:  normal range of motion, no lower extremity swelling or calf tenderness  Neuro:  Alert and oriented X3, Follows commands, moving all extremities spontaneously   Skin:  Warm, dry   Psych: Normal mood and affect, no suicidal ideation, homicidal ideation, auditory or visual hallucinations  ED Course     ED Course     Procedures       12:15 PM  The patient was seen and examined by Dr. Knight in Room 12.        Critical Care time:  none  Patient does not have a sober friend or family member  that can pick him up.    Labs Ordered and Resulted from Time of ED Arrival Up to the Time of Departure from the ED   ALCOHOL BREATH TEST POCT - Abnormal; Notable for the following:        Result Value    Alcohol Breath Test 0.231 (*)     All other components within normal limits            Assessments & Plan (with Medical Decision Making)   Patient is here with alcohol intoxication.  He does not desire detox and reports that he is unlikely to have withdrawal symptoms.  Patient, however does not have a ride home.  He needs somewhere where he can safely sober up.  I have spoken with [address] [place] [address] detox and they do have a male bed.  Patient will be sent to [address] [place] [address] to sober.  If he does not have withdrawal symptoms.  He may be discharged sooner than their usual protocol.  He has no acute mental health or medical issues at this time.    I have reviewed the nursing notes.    I have reviewed the findings, diagnosis, plan and need for follow up with the patient.    New Prescriptions    ATENOLOL (TENORMIN) 50 MG TABLET    Take 2 tablets (100 mg) by mouth daily    ZOLPIDEM (AMBIEN) 10 MG TABLET    Take 1 tablet (10 mg) by mouth nightly as needed for sleep       Final diagnoses:   Alcohol intoxication, uncomplicated (H)   Encounter for medication refill   I, Monisha Barajas, am serving as a trained medical scribe to document services personally performed by Renae Knight MD, based on the provider's statements to me.   IRenae MD, was physically present and have reviewed and verified the accuracy of this note documented by Monisha Barajas.      2/2/2018   [facility], [place], EMERGENCY DEPARTMENT     Grace Knight MD  02/02/18 2137

## 2019-05-08 ENCOUNTER — HOSPITAL ENCOUNTER (EMERGENCY)
Facility: CLINIC | Age: 59
Discharge: HOME | End: 2019-05-08
Attending: EMERGENCY MEDICINE | Admitting: EMERGENCY MEDICINE
Payer: COMMERCIAL

## 2019-05-08 VITALS
RESPIRATION RATE: 27 BRPM | TEMPERATURE: 97.3 F | SYSTOLIC BLOOD PRESSURE: 128 MMHG | HEIGHT: 73 IN | DIASTOLIC BLOOD PRESSURE: 88 MMHG | WEIGHT: 195 LBS | OXYGEN SATURATION: 97 % | BODY MASS INDEX: 25.84 KG/M2 | HEART RATE: 81 BPM

## 2019-05-08 DIAGNOSIS — F10.920 ALCOHOLIC INTOXICATION WITHOUT COMPLICATION (H): ICD-10-CM

## 2019-05-08 DIAGNOSIS — R00.2 PALPITATIONS: ICD-10-CM

## 2019-05-08 DIAGNOSIS — F10.220 ALCOHOL DEPENDENCE WITH UNCOMPLICATED INTOXICATION (H): ICD-10-CM

## 2019-05-08 LAB
ALBUMIN SERPL-MCNC: 3.8 G/DL (ref 3.4–5)
ALP SERPL-CCNC: 53 U/L (ref 40–150)
ALT SERPL W P-5'-P-CCNC: 38 U/L (ref 0–70)
ANION GAP SERPL CALCULATED.3IONS-SCNC: 12 MMOL/L (ref 3–14)
AST SERPL W P-5'-P-CCNC: 43 U/L (ref 0–45)
BASOPHILS # BLD AUTO: 0.1 10E9/L (ref 0–0.2)
BASOPHILS NFR BLD AUTO: 1.5 %
BILIRUB SERPL-MCNC: 1.4 MG/DL (ref 0.2–1.3)
BUN SERPL-MCNC: 11 MG/DL (ref 7–30)
CALCIUM SERPL-MCNC: 8.2 MG/DL (ref 8.5–10.1)
CHLORIDE SERPL-SCNC: 104 MMOL/L (ref 94–109)
CO2 SERPL-SCNC: 25 MMOL/L (ref 20–32)
CREAT SERPL-MCNC: 0.9 MG/DL (ref 0.66–1.25)
DIFFERENTIAL METHOD BLD: ABNORMAL
EOSINOPHIL # BLD AUTO: 0 10E9/L (ref 0–0.7)
EOSINOPHIL NFR BLD AUTO: 0.3 %
ERYTHROCYTE [DISTWIDTH] IN BLOOD BY AUTOMATED COUNT: 13.6 % (ref 10–15)
ETHANOL SERPL-MCNC: 0.39 G/DL
GFR SERPL CREATININE-BSD FRML MDRD: >90 ML/MIN/{1.73_M2}
GLUCOSE SERPL-MCNC: 121 MG/DL (ref 70–99)
HCT VFR BLD AUTO: 42.5 % (ref 40–53)
HGB BLD-MCNC: 14.1 G/DL (ref 13.3–17.7)
IMM GRANULOCYTES # BLD: 0 10E9/L (ref 0–0.4)
IMM GRANULOCYTES NFR BLD: 0.2 %
LYMPHOCYTES # BLD AUTO: 2.3 10E9/L (ref 0.8–5.3)
LYMPHOCYTES NFR BLD AUTO: 35.9 %
MCH RBC QN AUTO: 32.2 PG (ref 26.5–33)
MCHC RBC AUTO-ENTMCNC: 33.2 G/DL (ref 31.5–36.5)
MCV RBC AUTO: 97 FL (ref 78–100)
MONOCYTES # BLD AUTO: 0.6 10E9/L (ref 0–1.3)
MONOCYTES NFR BLD AUTO: 8.4 %
NEUTROPHILS # BLD AUTO: 3.5 10E9/L (ref 1.6–8.3)
NEUTROPHILS NFR BLD AUTO: 53.7 %
NRBC # BLD AUTO: 0 10*3/UL
NRBC BLD AUTO-RTO: 0 /100
PLATELET # BLD AUTO: 165 10E9/L (ref 150–450)
POTASSIUM SERPL-SCNC: 3.6 MMOL/L (ref 3.4–5.3)
PROT SERPL-MCNC: 7.4 G/DL (ref 6.8–8.8)
RBC # BLD AUTO: 4.38 10E12/L (ref 4.4–5.9)
SODIUM SERPL-SCNC: 141 MMOL/L (ref 133–144)
TROPONIN I SERPL-MCNC: <0.015 UG/L (ref 0–0.04)
WBC # BLD AUTO: 6.5 10E9/L (ref 4–11)

## 2019-05-08 PROCEDURE — 80053 COMPREHEN METABOLIC PANEL: CPT | Performed by: EMERGENCY MEDICINE

## 2019-05-08 PROCEDURE — 93010 ELECTROCARDIOGRAM REPORT: CPT | Mod: Z6 | Performed by: EMERGENCY MEDICINE

## 2019-05-08 PROCEDURE — 80320 DRUG SCREEN QUANTALCOHOLS: CPT | Performed by: EMERGENCY MEDICINE

## 2019-05-08 PROCEDURE — 84484 ASSAY OF TROPONIN QUANT: CPT | Performed by: EMERGENCY MEDICINE

## 2019-05-08 PROCEDURE — 85025 COMPLETE CBC W/AUTO DIFF WBC: CPT | Performed by: EMERGENCY MEDICINE

## 2019-05-08 PROCEDURE — 99284 EMERGENCY DEPT VISIT MOD MDM: CPT | Mod: 25 | Performed by: EMERGENCY MEDICINE

## 2019-05-08 PROCEDURE — 99284 EMERGENCY DEPT VISIT MOD MDM: CPT

## 2019-05-08 PROCEDURE — 93005 ELECTROCARDIOGRAM TRACING: CPT

## 2019-05-08 ASSESSMENT — ENCOUNTER SYMPTOMS: PALPITATIONS: 1

## 2019-05-08 ASSESSMENT — MIFFLIN-ST. JEOR: SCORE: 1763.39

## 2019-05-08 NOTE — DISCHARGE INSTRUCTIONS
Follow-up with your regular physician.  Return the emergency department as needed for any new or worsening symptoms.

## 2019-05-08 NOTE — ED PROVIDER NOTES
History     Chief Complaint   Patient presents with     Palpitations     HPI  Billy Calzada is a 59 year old male with a relevant past medical history of alcohol dependence, alcohol withdrawal syndrome, and essential tremor who presents to the Emergency Department with complaints of palpitations. He states he was at the bus stop when his heart started pounding. He thought he was having a heart attack. The patient reports a history of HTN, and states he doesn't take his medications when he drinks because he doesn't want to mix them. He has been drinking, he had about 1 pint of vodka today. He does not have someone to pick him up and notes he has an apartment. His palpitations have improved.      I have reviewed the Medications, Allergies, Past Medical and Surgical History, and Social History in the [employer] system.  Past Medical History:   Diagnosis Date     Alcohol abuse      Anxiety      Hyperlipidemia      Hypertension        No past surgical history on file.    Family History   Problem Relation Age of Onset     Tremor Son      Tremor Son        Social History     Tobacco Use     Smoking status: Never Smoker     Smokeless tobacco: Never Used   Substance Use Topics     Alcohol use: Yes     Alcohol/week: 1.2 oz     Types: 2 Cans of beer per week       No current facility-administered medications for this encounter.      Current Outpatient Medications   Medication     atenolol (TENORMIN) 100 MG tablet     atenolol (TENORMIN) 50 MG tablet     lorazepam (ATIVAN) 2 MG tablet     Multiple Vitamins-Minerals (MULTIVITAL PO)     simvastatin (ZOCOR) 20 MG tablet     zolpidem (AMBIEN) 10 MG tablet        Allergies   Allergen Reactions     Iodine I 131 Tositumomab      Keflex [Cephalexin-Fd&C Yellow #6]        Review of Systems   Cardiovascular: Positive for palpitations.   All other systems reviewed and are negative.      Physical Exam   BP: (!) 161/97  Pulse: 78  Heart Rate: 79  Temp: 97.3  F (36.3  C)  Resp: 16  Height:  "185.4 cm (6' 1\")  Weight: 88.5 kg (195 lb)  SpO2: 99 %      Physical Exam   Constitutional: No distress.   HENT:   Head: Atraumatic.   Mouth/Throat: Oropharynx is clear and moist. No oropharyngeal exudate.   Eyes: Pupils are equal, round, and reactive to light. No scleral icterus.   Cardiovascular: Normal heart sounds.   Pulmonary/Chest: Breath sounds normal. No respiratory distress.   Abdominal: Soft. He exhibits no distension. There is no tenderness.   Musculoskeletal: He exhibits no edema or tenderness.   Neurological: He is alert.   Slurred speech, awake otherwise answering questions appropriately   Skin: Skin is warm. He is not diaphoretic.   Psychiatric: He has a normal mood and affect. His behavior is normal.       ED Course        Procedures             EKG Interpretation:      Interpreted by Blas Cifuentes  Time reviewed: 1430  Symptoms at time of EKG: Palpitations  Rhythm: normal sinus   Rate: normal  Axis: normal  Ectopy: none  Conduction: normal  ST Segments/ T Waves: No ST-T wave changes  Q Waves: none  Comparison to prior: Unchanged    Clinical Impression: normal EKG           Labs Ordered and Resulted from Time of ED Arrival Up to the Time of Departure from the ED   CBC WITH PLATELETS DIFFERENTIAL - Abnormal; Notable for the following components:       Result Value    RBC Count 4.38 (*)     All other components within normal limits   COMPREHENSIVE METABOLIC PANEL - Abnormal; Notable for the following components:    Glucose 121 (*)     Calcium 8.2 (*)     Bilirubin Total 1.4 (*)     All other components within normal limits   ALCOHOL ETHYL - Abnormal; Notable for the following components:    Ethanol g/dL 0.39 (*)     All other components within normal limits   TROPONIN I     Results for orders placed or performed during the hospital encounter of 05/08/19   CBC with platelets differential   Result Value Ref Range    WBC 6.5 4.0 - 11.0 10e9/L    RBC Count 4.38 (L) 4.4 - 5.9 10e12/L    Hemoglobin " 14.1 13.3 - 17.7 g/dL    Hematocrit 42.5 40.0 - 53.0 %    MCV 97 78 - 100 fl    MCH 32.2 26.5 - 33.0 pg    MCHC 33.2 31.5 - 36.5 g/dL    RDW 13.6 10.0 - 15.0 %    Platelet Count 165 150 - 450 10e9/L    Diff Method Automated Method     % Neutrophils 53.7 %    % Lymphocytes 35.9 %    % Monocytes 8.4 %    % Eosinophils 0.3 %    % Basophils 1.5 %    % Immature Granulocytes 0.2 %    Nucleated RBCs 0 0 /100    Absolute Neutrophil 3.5 1.6 - 8.3 10e9/L    Absolute Lymphocytes 2.3 0.8 - 5.3 10e9/L    Absolute Monocytes 0.6 0.0 - 1.3 10e9/L    Absolute Eosinophils 0.0 0.0 - 0.7 10e9/L    Absolute Basophils 0.1 0.0 - 0.2 10e9/L    Abs Immature Granulocytes 0.0 0 - 0.4 10e9/L    Absolute Nucleated RBC 0.0    Comprehensive metabolic panel   Result Value Ref Range    Sodium 141 133 - 144 mmol/L    Potassium 3.6 3.4 - 5.3 mmol/L    Chloride 104 94 - 109 mmol/L    Carbon Dioxide 25 20 - 32 mmol/L    Anion Gap 12 3 - 14 mmol/L    Glucose 121 (H) 70 - 99 mg/dL    Urea Nitrogen 11 7 - 30 mg/dL    Creatinine 0.90 0.66 - 1.25 mg/dL    GFR Estimate >90 >60 mL/min/[1.73_m2]    GFR Estimate If Black >90 >60 mL/min/[1.73_m2]    Calcium 8.2 (L) 8.5 - 10.1 mg/dL    Bilirubin Total 1.4 (H) 0.2 - 1.3 mg/dL    Albumin 3.8 3.4 - 5.0 g/dL    Protein Total 7.4 6.8 - 8.8 g/dL    Alkaline Phosphatase 53 40 - 150 U/L    ALT 38 0 - 70 U/L    AST 43 0 - 45 U/L   Troponin I   Result Value Ref Range    Troponin I ES <0.015 0.000 - 0.045 ug/L   Alcohol ethyl   Result Value Ref Range    Ethanol g/dL 0.39 (HH) <0.01 g/dL   EKG 12 lead   Result Value Ref Range    Interpretation ECG Click View Image link to view waveform and result             Assessments & Plan (with Medical Decision Making)   59-year-old male presents to us with a chief complaint of palpitations.  He also reports drinking a day.  Differential includes but not limited to palpitations, dysrhythmia, anemia, dehydration, alcohol intoxication.  Patient is EKG and troponin are normal.  Kidney  function electrolytes and blood counts are all normal.  Patient's alcohol is very elevated at 0.39.  He is currently asymptomatic.  Given patient's elevated alcohol we will observe him in the emergency department until he becomes clinically sober.  He does not have anyone that can give him a ride home at this time.  Patient be signed out to evening provider pending sobriety and discharge.  Patient understands that he should follow-up with his primary care provider if he has further episodes of racing heart for further evaluation.    I have reviewed the nursing notes.    I have reviewed the findings, diagnosis, plan and need for follow up with the patient.       Medication List      There are no discharge medications for this visit.         Final diagnoses:   Palpitations   Alcoholic intoxication without complication (H)     I, Francine Owens, am serving as a trained medical scribe to document services personally performed by  Blas Cifuentes DO, based on the provider's statements to me.      Blas WOO DO, was physically present and have reviewed and verified the accuracy of this note documented by Francine Owens.     5/8/2019   [facility], [place], EMERGENCY DEPARTMENT     Blas Cifuentes DO  05/08/19 1543

## 2019-05-08 NOTE — ED AVS SNAPSHOT
[facility], [place], Emergency Department  [address] [place] [address]  Phone:  [phone]                                    Billy Calzada   MRN: [id number]    Department:  [facility], Emergency Department   Date of Visit:  5/8/2019           After Visit Summary Signature Page    I have received my discharge instructions, and my questions have been answered. I have discussed any challenges I see with this plan with the nurse or doctor.    ..........................................................................................................................................  Patient/Patient Representative Signature      ..........................................................................................................................................  Patient Representative Print Name and Relationship to Patient    ..................................................               ................................................  Date                                   Time    ..........................................................................................................................................  Reviewed by Signature/Title    ...................................................              ..............................................  Date                                               Time          22EPIC Rev 08/18

## 2019-05-08 NOTE — ED NOTES
Pt informed nurse that he wanted to leave. Nurse asked pt to wait for MD, but when nurse and provider went back to the room pt was no longer there. To writers knowledge IV is still in pts hand.

## 2019-05-08 NOTE — ED NOTES
Bed: ED23  Expected date:   Expected time:   Means of arrival:   Comments:  SPF 59M heart palpitations

## 2019-05-08 NOTE — ED TRIAGE NOTES
"[place] himself [place], developed heart palpitations   Admits to one pint of vodka today, last drink 3 hours ago97  Has not taken BP meds in 3 days,\"I didn't want them messing with the alcohol\"      "

## 2019-05-09 LAB — INTERPRETATION ECG - MUSE: NORMAL

## 2019-05-09 NOTE — ED NOTES
Pt has a steady gait. Is not currently in withdrawal and had tolerated PO intake. Pt would like to discharge. I believe he is safe for discharge and discussed the plan with the nurse taking care of him. We are in agreement he is safe.       Christopher Ennis MD  05/08/19 4006

## 2019-06-02 ENCOUNTER — HOSPITAL ENCOUNTER (EMERGENCY)
Facility: CLINIC | Age: 59
Discharge: HOME | End: 2019-06-02
Attending: EMERGENCY MEDICINE | Admitting: EMERGENCY MEDICINE
Payer: COMMERCIAL

## 2019-06-02 VITALS
HEART RATE: 113 BPM | TEMPERATURE: 97.5 F | RESPIRATION RATE: 16 BRPM | WEIGHT: 186.5 LBS | SYSTOLIC BLOOD PRESSURE: 136 MMHG | OXYGEN SATURATION: 97 % | DIASTOLIC BLOOD PRESSURE: 92 MMHG

## 2019-06-02 DIAGNOSIS — F10.229 ACUTE ALCOHOLIC INTOXICATION IN ALCOHOLISM WITH COMPLICATION (H): ICD-10-CM

## 2019-06-02 DIAGNOSIS — R41.82 ALTERED MENTAL STATUS, UNSPECIFIED ALTERED MENTAL STATUS TYPE: ICD-10-CM

## 2019-06-02 LAB
ALBUMIN SERPL-MCNC: 4.7 G/DL (ref 3.4–5)
ALCOHOL BREATH TEST: 0.28 (ref 0–0.01)
ALCOHOL BREATH TEST: 0.28 (ref 0–0.01)
ALP SERPL-CCNC: 26 U/L (ref 40–150)
ALT SERPL W P-5'-P-CCNC: 41 U/L (ref 0–70)
ANION GAP SERPL CALCULATED.3IONS-SCNC: 12 MMOL/L (ref 3–14)
AST SERPL W P-5'-P-CCNC: 34 U/L (ref 0–45)
BASOPHILS # BLD AUTO: 0.1 10E9/L (ref 0–0.2)
BASOPHILS NFR BLD AUTO: 1.7 %
BILIRUB SERPL-MCNC: 0.5 MG/DL (ref 0.2–1.3)
BUN SERPL-MCNC: 12 MG/DL (ref 7–30)
CALCIUM SERPL-MCNC: 8.1 MG/DL (ref 8.5–10.1)
CHLORIDE SERPL-SCNC: 107 MMOL/L (ref 94–109)
CO2 SERPL-SCNC: 26 MMOL/L (ref 20–32)
CREAT SERPL-MCNC: 0.8 MG/DL (ref 0.66–1.25)
DIFFERENTIAL METHOD BLD: NORMAL
EOSINOPHIL # BLD AUTO: 0.1 10E9/L (ref 0–0.7)
EOSINOPHIL NFR BLD AUTO: 0.9 %
ERYTHROCYTE [DISTWIDTH] IN BLOOD BY AUTOMATED COUNT: 14 % (ref 10–15)
ETHANOL SERPL-MCNC: 0.48 G/DL
GFR SERPL CREATININE-BSD FRML MDRD: ABNORMAL ML/MIN/{1.73_M2}
GLUCOSE BLDC GLUCOMTR-MCNC: 96 MG/DL (ref 70–99)
GLUCOSE SERPL-MCNC: 93 MG/DL (ref 70–99)
HCT VFR BLD AUTO: 47 % (ref 40–53)
HGB BLD-MCNC: 15.7 G/DL (ref 13.3–17.7)
IMM GRANULOCYTES # BLD: 0 10E9/L (ref 0–0.4)
IMM GRANULOCYTES NFR BLD: 0.2 %
INTERPRETATION ECG - MUSE: NORMAL
LIPASE SERPL-CCNC: 263 U/L (ref 73–393)
LYMPHOCYTES # BLD AUTO: 3 10E9/L (ref 0.8–5.3)
LYMPHOCYTES NFR BLD AUTO: 51.9 %
MCH RBC QN AUTO: 32.6 PG (ref 26.5–33)
MCHC RBC AUTO-ENTMCNC: 33.4 G/DL (ref 31.5–36.5)
MCV RBC AUTO: 98 FL (ref 78–100)
MONOCYTES # BLD AUTO: 0.2 10E9/L (ref 0–1.3)
MONOCYTES NFR BLD AUTO: 3 %
NEUTROPHILS # BLD AUTO: 2.4 10E9/L (ref 1.6–8.3)
NEUTROPHILS NFR BLD AUTO: 42.3 %
NRBC # BLD AUTO: 0 10*3/UL
NRBC BLD AUTO-RTO: 0 /100
PLATELET # BLD AUTO: 270 10E9/L (ref 150–450)
POTASSIUM SERPL-SCNC: 4 MMOL/L (ref 3.4–5.3)
PROT SERPL-MCNC: 8.8 G/DL (ref 6.8–8.8)
RBC # BLD AUTO: 4.82 10E12/L (ref 4.4–5.9)
SODIUM SERPL-SCNC: 145 MMOL/L (ref 133–144)
WBC # BLD AUTO: 5.7 10E9/L (ref 4–11)

## 2019-06-02 PROCEDURE — 82075 ASSAY OF BREATH ETHANOL: CPT | Performed by: EMERGENCY MEDICINE

## 2019-06-02 PROCEDURE — 83690 ASSAY OF LIPASE: CPT | Performed by: EMERGENCY MEDICINE

## 2019-06-02 PROCEDURE — 80053 COMPREHEN METABOLIC PANEL: CPT | Performed by: EMERGENCY MEDICINE

## 2019-06-02 PROCEDURE — 25000128 H RX IP 250 OP 636: Performed by: EMERGENCY MEDICINE

## 2019-06-02 PROCEDURE — 99285 EMERGENCY DEPT VISIT HI MDM: CPT | Performed by: EMERGENCY MEDICINE

## 2019-06-02 PROCEDURE — 99284 EMERGENCY DEPT VISIT MOD MDM: CPT | Mod: 25 | Performed by: EMERGENCY MEDICINE

## 2019-06-02 PROCEDURE — 93010 ELECTROCARDIOGRAM REPORT: CPT | Mod: Z6 | Performed by: EMERGENCY MEDICINE

## 2019-06-02 PROCEDURE — 93005 ELECTROCARDIOGRAM TRACING: CPT | Performed by: EMERGENCY MEDICINE

## 2019-06-02 PROCEDURE — 25800025 ZZH RX 258: Performed by: EMERGENCY MEDICINE

## 2019-06-02 PROCEDURE — 82075 ASSAY OF BREATH ETHANOL: CPT | Mod: 91 | Performed by: EMERGENCY MEDICINE

## 2019-06-02 PROCEDURE — 85025 COMPLETE CBC W/AUTO DIFF WBC: CPT | Performed by: EMERGENCY MEDICINE

## 2019-06-02 PROCEDURE — 00000146 ZZHCL STATISTIC GLUCOSE BY METER IP

## 2019-06-02 PROCEDURE — 80320 DRUG SCREEN QUANTALCOHOLS: CPT | Performed by: EMERGENCY MEDICINE

## 2019-06-02 PROCEDURE — 25000125 ZZHC RX 250: Performed by: EMERGENCY MEDICINE

## 2019-06-02 RX ORDER — SODIUM CHLORIDE 9 MG/ML
1000 INJECTION, SOLUTION INTRAVENOUS CONTINUOUS
Status: DISCONTINUED | OUTPATIENT
Start: 2019-06-02 | End: 2019-06-02 | Stop reason: HOSPADM

## 2019-06-02 RX ADMIN — FOLIC ACID: 5 INJECTION, SOLUTION INTRAMUSCULAR; INTRAVENOUS; SUBCUTANEOUS at 02:49

## 2019-06-02 NOTE — ED TRIAGE NOTES
Pt found down by a bus stop bench in [place], brought in by ambulance.  Pt alert, speech slurred and mumbling.  Unable to complete triage due to the level of intoxication.  CMS intact.  Paramedics state that a couple of beer bottles and a bottle of booze were found with him.  Pt was incontinent of urine.

## 2019-06-02 NOTE — ED NOTES
8:29 AM  Patient was signed out to me as a 59-year-old male with chronic alcoholism who was admitted to the emergency room approximately 6-1/2 hours ago found to be acutely intoxicated.  He was to metabolize alcohol until he could perform ADLs.  Patient was seen and examined.  He is able to walk without difficulty and is drinking fluids.  He request discharge and his request is granted.     Joshua Escudero MD  06/02/19 0859

## 2019-06-02 NOTE — ED PROVIDER NOTES
History     Chief Complaint   Patient presents with     Alcohol Intoxication     found at bus stop in [place], bottle of booze and 2 beers, mummbling and slurred speech     HPI  Vanessa Palomo Solano is a 89 year old male who was found intoxicated in public and [place] and brought in here by paramedics.  His identity is unsure he may be Billy Calzada.  He is quite intoxicated but protecting his airway.  History and review of systems is limited at this point because of his level of intoxication.    I have reviewed the Medications, Allergies, Past Medical and Surgical History, and Social History in the [employer] system.  No past medical history on file.  Social History     Socioeconomic History     Marital status:      Spouse name: Not on file     Number of children: Not on file     Years of education: Not on file     Highest education level: Not on file   Occupational History     Not on file   Social Needs     Financial resource strain: Not on file     Food insecurity:     Worry: Not on file     Inability: Not on file     Transportation needs:     Medical: Not on file     Non-medical: Not on file   Tobacco Use     Smoking status: Not on file     Tobacco comment: KEVEN   Substance and Sexual Activity     Alcohol use: Yes     Drug use: Not on file     Sexual activity: Not on file   Lifestyle     Physical activity:     Days per week: Not on file     Minutes per session: Not on file     Stress: Not on file   Relationships     Social connections:     Talks on phone: Not on file     Gets together: Not on file     Attends [employer] service: Not on file     Active member of club or organization: Not on file     Attends meetings of clubs or organizations: Not on file     Relationship status: Not on file     Intimate partner violence:     Fear of current or ex partner: Not on file     Emotionally abused: Not on file     Physically abused: Not on file     Forced sexual activity: Not on file   Other Topics Concern      Not on file   Social History Narrative     Not on file       Review of Systems   Unable to perform ROS: Mental status change       Physical Exam   BP: (!) 131/91  Pulse: 87  Heart Rate: 92  Temp: 95.8  F (35.4  C)  Resp: 18  SpO2: 100 %      Physical Exam   Constitutional: He appears listless.   HENT:   Head: Atraumatic.   Eyes: Pupils are equal, round, and reactive to light. EOM are normal.   Neck: Neck supple.   Cardiovascular: Normal rate, regular rhythm and normal heart sounds.   Pulmonary/Chest: Effort normal and breath sounds normal.   Neurological: He appears listless. GCS eye subscore is 4. GCS verbal subscore is 4. GCS motor subscore is 6.   Intoxicated with slurred speech   Nursing note and vitals reviewed.      ED Course        Procedures        Medications   0.9% sodium chloride BOLUS (has no administration in time range)     Followed by   sodium chloride 0.9% infusion (has no administration in time range)   dextrose 5% and 0.45% NaCl 1,000 mL with INFUVITE ADULT 10 mL, thiamine 100 mg, folic acid 1 mg infusion ( Intravenous Stopped 6/2/19 0630)          EKG Interpretation:      Interpreted by Ciro Fonseca  Time reviewed:6:50 AM    Symptoms at time of EKG: tachycardia   Rhythm: normal sinus   Rate: 106  Axis: NORMAL  Ectopy: none  Conduction: normal  ST Segments/ T Waves: No ST-T wave changes  Q Waves: none  Comparison to prior: No old EKG available    Clinical Impression: sinus tach       Labs Ordered and Resulted from Time of ED Arrival Up to the Time of Departure from the ED   COMPREHENSIVE METABOLIC PANEL - Abnormal; Notable for the following components:       Result Value    Sodium 145 (*)     Calcium 8.1 (*)     Alkaline Phosphatase 26 (*)     All other components within normal limits   ALCOHOL ETHYL - Abnormal; Notable for the following components:    Ethanol g/dL 0.48 (*)     All other components within normal limits   CBC WITH PLATELETS DIFFERENTIAL   LIPASE   GLUCOSE MONITOR  NURSING POCT   GLUCOSE BY METER   DRUG ABUSE SCREEN 6 CHEM DEP URINE ([facility])   ALCOHOL BREATH TEST POCT   ALCOHOL BREATH TEST POCT            Assessments & Plan (with Medical Decision Making)   59-year-old male chronic alcoholic was found intoxicated in public and brought to the [place] emergency department.  Your blood alcohol was 0.48.  His labs were checked he was given a banana bag and additional IV fluids EKG was obtained.  By morning he was able to talk and identify himself.  He says he lives in a wet house and does not want detox or treatment.  Informed him he would need to stay until he was sober unless he had a sober ride.  He has no trauma and is currently asymptomatic awaiting sobriety.    I have reviewed the nursing notes.    I have reviewed the findings, diagnosis, plan and need for follow up with the patient.       Medication List      There are no discharge medications for this visit.         Final diagnoses:   Acute alcoholic intoxication in alcoholism with complication (H)   Altered mental status, unspecified altered mental status type       6/2/2019   [facility], [place], EMERGENCY DEPARTMENT     Ciro Fonseca MD  06/02/19 0659

## 2019-06-02 NOTE — ED NOTES
Bed: ED08  Expected date: 6/2/19  Expected time: 2:06 AM  Means of arrival:   Comments:  Billy Pastrana  approx 50 ETOH

## 2019-06-02 NOTE — DISCHARGE INSTRUCTIONS
You should go to alcohol detox and then get into a chemical dependency treatment program  See the numbers below for detox facilities  Get a [place] 25 evaluation through your county of residence      You may call the following numbers tomorrow to check on bed availability:    [facility]    [phone]  [address] [place] [address] Detox         [phone]  [facility]        [phone]

## 2019-06-02 NOTE — ED AVS SNAPSHOT
[facility], [place], Emergency Department  [address]  [place] [address]  Phone:  [phone]  Fax:  [phone]                                    Bilyl Calzada   MRN: [id number]    Department:  [facility], Emergency Department   Date of Visit:  6/2/2019           After Visit Summary Signature Page    I have received my discharge instructions, and my questions have been answered. I have discussed any challenges I see with this plan with the nurse or doctor.    ..........................................................................................................................................  Patient/Patient Representative Signature      ..........................................................................................................................................  Patient Representative Print Name and Relationship to Patient    ..................................................               ................................................  Date                                   Time    ..........................................................................................................................................  Reviewed by Signature/Title    ...................................................              ..............................................  Date                                               Time          22EPIC Rev 08/18

## 2020-09-13 ENCOUNTER — HOSPITAL ENCOUNTER (OUTPATIENT)
Facility: CLINIC | Age: 60
Setting detail: OBSERVATION
Discharge: HOME | End: 2020-09-14
Attending: EMERGENCY MEDICINE | Admitting: EMERGENCY MEDICINE
Payer: COMMERCIAL

## 2020-09-13 ENCOUNTER — APPOINTMENT (OUTPATIENT)
Dept: CT IMAGING | Facility: CLINIC | Age: 60
End: 2020-09-13
Attending: EMERGENCY MEDICINE
Payer: COMMERCIAL

## 2020-09-13 DIAGNOSIS — S09.90XA CLOSED HEAD INJURY, INITIAL ENCOUNTER: ICD-10-CM

## 2020-09-13 DIAGNOSIS — R41.82 ALTERED MENTAL STATUS, UNSPECIFIED ALTERED MENTAL STATUS TYPE: ICD-10-CM
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
## 2020-09-13 DIAGNOSIS — F10.10 ETOH ABUSE: ICD-10-CM

## 2020-09-13 DIAGNOSIS — S01.112A LEFT EYELID LACERATION, INITIAL ENCOUNTER: ICD-10-CM

## 2020-09-13 DIAGNOSIS — S00.83XA CONTUSION OF OTHER PART OF HEAD, INITIAL ENCOUNTER: ICD-10-CM

## 2020-09-13 LAB
ALCOHOL BREATH TEST: 0.32 (ref 0–0.01)
AMPHETAMINES UR QL SCN: NEGATIVE
BARBITURATES UR QL: NEGATIVE
BENZODIAZ UR QL: NEGATIVE
CANNABINOIDS UR QL SCN: POSITIVE
COCAINE UR QL: NEGATIVE
ETHANOL UR QL SCN: POSITIVE
OPIATES UR QL SCN: NEGATIVE

## 2020-09-13 PROCEDURE — 99285 EMERGENCY DEPT VISIT HI MDM: CPT | Mod: 25 | Performed by: EMERGENCY MEDICINE

## 2020-09-13 PROCEDURE — 80320 DRUG SCREEN QUANTALCOHOLS: CPT | Performed by: EMERGENCY MEDICINE

## 2020-09-13 PROCEDURE — 99219 ZZC INITIAL OBSERVATION CARE,LEVL II: CPT | Mod: Z6 | Performed by: EMERGENCY MEDICINE

## 2020-09-13 PROCEDURE — 82075 ASSAY OF BREATH ETHANOL: CPT | Performed by: EMERGENCY MEDICINE

## 2020-09-13 PROCEDURE — 80307 DRUG TEST PRSMV CHEM ANLYZR: CPT | Performed by: EMERGENCY MEDICINE

## 2020-09-13 PROCEDURE — 70450 CT HEAD/BRAIN W/O DYE: CPT

## 2020-09-13 NOTE — ED AVS SNAPSHOT
[facility], [place], Emergency Department  [address]  [place] [address]  Phone:  [phone]  Fax:  [phone]                                    Billy Calzada   MRN: [id number]    Department:  [facility], Emergency Department   Date of Visit:  9/13/2020           After Visit Summary Signature Page    I have received my discharge instructions, and my questions have been answered. I have discussed any challenges I see with this plan with the nurse or doctor.    ..........................................................................................................................................  Patient/Patient Representative Signature      ..........................................................................................................................................  Patient Representative Print Name and Relationship to Patient    ..................................................               ................................................  Date                                   Time    ..........................................................................................................................................  Reviewed by Signature/Title    ...................................................              ..............................................  Date                                               Time          22EPIC Rev 08/18

## 2020-09-14 VITALS
DIASTOLIC BLOOD PRESSURE: 80 MMHG | TEMPERATURE: 98.2 F | HEART RATE: 95 BPM | OXYGEN SATURATION: 97 % | SYSTOLIC BLOOD PRESSURE: 135 MMHG | RESPIRATION RATE: 16 BRPM

## 2020-09-14 PROCEDURE — 99217 ZZC OBSERVATION CARE DISCHARGE: CPT | Mod: Z6 | Performed by: EMERGENCY MEDICINE

## 2020-09-14 PROCEDURE — G0378 HOSPITAL OBSERVATION PER HR: HCPCS

## 2020-09-14 NOTE — ED PROVIDER NOTES
ED Observation History and Physical  [facility]  Observation Initiation Date: Sep 13, 2020    Billy Calzada MRN: [id number]   Age: 60 year old YOB: 1960     History     Chief Complaint   Patient presents with     Alcohol Intoxication     Fall     Fall while intoxicated. Lac to L eyebrow     HPI  Billy Calzada is a 60 year old male with PMH notable for ETOH abuse who presented to the ED with altered mental status. History limited due to altered mental status. There is reason to suspect alcohol and/or drug intoxication as the etiology of altered mental status.  Patient is that he drinks heavily and does not know exactly what happened today.  Patient does not know how he got to the emergency department.  Patient does have a contusion on his right forehead.  He is unaware of how it happened.  Patient notes pain at the site otherwise denies any headache.  Patient says he does not want detox and does not want treatment for alcohol withdrawal.    Past Medical and Surgical History, Medications, Allergies, and Social History were reviewed in the electronic medical record. Review with the patient was attempted but limited due to altered mental status.      Review of Systems  A complete review of systems was attempted but limited due to altered mental status.    Physical Exam   BP: 132/77  Pulse: 76  Temp: 98.1  F (36.7  C)  Resp: 16  SpO2: 93 %    Physical Exam  General: smells of EtOH, no acute distress  HENT: MMM. Contusion of left forehead with small 1.5 cm abrasion/superficial laceration  Eyes: PERRL, normal sclerae, nystagmus present  Neck: non-tender, supple; refused c-collar and removed; no midline neck pain   Cardio: Regular rate. Regular rhythm.   Resp: Normal work of breathing, normal respiratory rate  Abdomen: no tenderness, non-distended, no rebound, no guarding  Neuro: alert, slow to respond. Slurred speech. Confused. CN II-XII grossly intact. Grossly normal strength  and sensation.   Integumentary/Skin: no rash visualized, normal color    ED Course      Procedures                          Labs Ordered and Resulted from Time of ED Arrival Up to the Time of Departure from the ED   DRUG ABUSE SCREEN 6 CHEM DEP URINE ([facility]) - Abnormal; Notable for the following components:       Result Value    Cannabinoids Qual Urine Positive (*)     All other components within normal limits   ALCOHOL BREATH TEST POCT - Abnormal; Notable for the following components:    Alcohol Breath Test 0.324 (*)     All other components within normal limits     Results for orders placed or performed during the hospital encounter of 09/13/20   Drug abuse screen 6 urine (tox)     Status: Abnormal (In process)   Result Value Ref Range    Amphetamine Qual Urine Negative NEG^Negative    Barbiturates Qual Urine Negative NEG^Negative    Benzodiazepine Qual Urine Negative NEG^Negative    Cannabinoids Qual Urine Positive (A) NEG^Negative    Cocaine Qual Urine Negative NEG^Negative    Ethanol Qual Urine PENDING NEG^Negative    Opiates Qualitative Urine Negative NEG^Negative   Alcohol breath test POCT     Status: Abnormal   Result Value Ref Range    Alcohol Breath Test 0.324 (A) 0.00 - 0.01     Medications - No data to display         Assessments & Plan (with Medical Decision Making)   Patient arriving with altered mental status with reason to suspect alcohol or other drug intoxication as etiology. Nursing notes reviewed. Exam with findings suggestive of trauma, non-focal. Breath EtOH 0.324. Glucose was not checked.     AMS likely due to intoxication delirium but cannot immediately rule out other dangerous etiologies of AMS. Plan close clinical monitoring of the patient and his mental status for clearing of intoxicating substance. With approriate clearing, the patient would likely be able to be discharged. If not appropriately clearing, plan broadening of work-up, potentially including CT head and serum labs.     During  my care, the patient did not require medications for agitation, and did not require restraints/seclusion for patient and/or provider safety.     With period of monitoring, the patient continues to clear appropriately but is not yet clinically sober at this time. Patient signed out to oncoming provider with plan for further monitoring, likely discharge if appropriately clear with sobriety, further work-up if indicated.     Preliminary diagnosis:  Altered mental status, unspecified   ETOH abuse  Head injury    --  Kacey Nolasco MD   [facility], EMERGENCY DEPARTMENT  9/13/2020      Kacey Nolasco MD  09/13/20 4370

## 2020-09-14 NOTE — ED NOTES
Patient presents to ED with fall while intoxicated. Patient denies pain to neck or back. Patient has abrasion and swelling to L brow. Patient placed in C collar for precaution. Patient incontinent of urine. Patient assisted with changing into brief and scrub pants.

## 2020-09-14 NOTE — ED PROVIDER NOTES
ED Observation Discharge Summary  [facility]  Discharge Date: 9/14/2020    Billy Calzada MRN: [id number]   Age: 60 year old YOB: 1960     Brief HPI & Initial ED Course     Chief Complaint   Patient presents with     Alcohol Intoxication     Fall     Fall while intoxicated. Lac to L eyebrow     HPI  Billy Calzada is a 60 year old male with PMH notable for alcohol use who presented to the ED with altered mental status. Initial history was limited due to altered mental status. The patient arrived with altered mental status with reason to suspect alcohol or other drug intoxication as etiology, and an exam that was with findings suggestive of trauma.  CT head ordered by previous provider was negative for acute pathology.  No additional injuries noted    Upon being evaluated in the emergency department, the patient was found to have a condition that would benefit from ongoing monitoring. Observation care was initiated with plan for close clinical monitoring of the patient and his mental status for clearing of intoxicating substance, and broadening of the work-up if not clearing appropriately or if other indications develop.     See ED Observation H&P for further details on the patient's presenting history and initial evaluation.     Physical Exam   BP: 132/77  Pulse: 76  Temp: 98.1  F (36.7  C)  Resp: 16  SpO2: 93 %    Physical Exam  General: no acute distress. Alert.   HENT: MMM. L periorbital ecchymoses.   Eyes: PERRL, normal sclerae   Neck: non-tender, supple  Cardio: Regular rate. Regular rhythm.   Resp: Normal work of breathing, normal respiratory rate  Abdomen: no tenderness, non-distended, no rebound, no guarding  Neuro: alert, fully oriented. Steady gait. CN II-XII grossly intact. Grossly normal strength and sensation.   Integumentary/Skin: no rash visualized, normal color    Results      CT Head w/o Contrast   Final Result   IMPRESSION:   1.  No CT finding of a mass,  hemorrhage or focal area suggestive of acute infarct.   2.  Mild age-related changes.             Labs Ordered and Resulted from Time of ED Arrival Up to the Time of Departure from the ED   DRUG ABUSE SCREEN 6 CHEM DEP URINE ([facility]) - Abnormal; Notable for the following components:       Result Value    Cannabinoids Qual Urine Positive (*)     Ethanol Qual Urine Positive (*)     All other components within normal limits   ALCOHOL BREATH TEST POCT - Abnormal; Notable for the following components:    Alcohol Breath Test 0.324 (*)     All other components within normal limits   VITAL SIGNS   NEURO CHECKS            Observation Course   The patient was admitted to observation status with plan for close clinical monitoring of the patient and his mental status for clearing of intoxicating substance, and broadening of the work-up if not clearing appropriately or if other indications develop.     Serial assessments of the patient's mental status were performed. Nursing notes were reviewed. During the observation period, the patient did not require medications for agitation, and did not require restraints/seclusion for patient and/or provider safety.        With monitoring, patient's mental status cleared. Patient then clinically sober with steady gait and tolerating PO. Normalization of mental status with sobering makes other causes of altered mental status very unlikely.     After counseling on the diagnosis, work-up, and treatment plan, the patient was discharged. Recommended safe cessation of EtOH/drugs and provided information on community treatment resources. Patient to follow-up with primary care in the coming days for recheck and further cessation counseling. Patient to return to the ED if any urgent or potentially life-threatening concerns.    Discharge Diagnoses:   Final diagnoses:   ETOH abuse   Altered mental status, unspecified altered mental status type   Closed head injury, initial encounter       --  Leno VIDES  DO Anjana  [facility], [place], EMERGENCY DEPARTMENT  9/14/2020             Leon Yeung DO  09/14/20 0513

## 2020-09-14 NOTE — DISCHARGE INSTRUCTIONS
Please use the below resources and your primary care physician to safely cease alcohol and/or substance use.     Return to the ED if you are having any urgent/life-threatening concerns.     DISCHARGE RESOURCES:  -[facility] [phone] (detox), [phone] (outpatient & [facility])    -SMART Recovery - self management for addiction recovery:  www.smartrecovery.org    -[facility] ~ A Health Crisis Resource & Support Center: [phone].  -[facility] [phone]   -Substance Abuse and Mental Health Services (www.samhsa.gov)  -Harm Reduction Coalition (www. Harmreduction.org)  -[place] Opioid Prevention Coalition: www.opioidcoalition.org  -Poison control 1-706.160.4761       Sober Support Group Information:  AA/NA & Sponsor/Support  -Alcoholics Anonymous (www.alcoholics-anonymous.org): for local information 24 hours/day  -AA Intergroup service office in [place] (http://www.aastpaul.org/) [phone]  -AA Intergroup service office in [facility]: [phone]. (http://www.a[place].org/)  -Narcotics Anonymous (www.nam[place].org) (202) 494-8714   -Sober Fun Activities: www.sober-activities.[employer]/[facility]//[facility] Recovery Connection ([facility])  [facility] connects people seeking recovery to resources that help foster and sustain long-term recovery.  Whether you are seeking resources for treatment, transportation, housing, job training, education, health care or other pathways to recovery, [facility] is a great place to start.   Phone: [phone]. www.[place]AgInfoLink.Eda[place] (Great listing of all types of recovery and non-recovery related resources)

## 2021-06-02 ENCOUNTER — RECORDS - HEALTHEAST (OUTPATIENT)
Dept: ADMINISTRATIVE | Facility: CLINIC | Age: 61
End: 2021-06-02

## 2022-10-03 ENCOUNTER — HOSPITAL ENCOUNTER (EMERGENCY)
Facility: HOSPITAL | Age: 62
Discharge: HOME | End: 2022-10-03
Attending: EMERGENCY MEDICINE | Admitting: EMERGENCY MEDICINE
Payer: COMMERCIAL

## 2022-10-03 VITALS
SYSTOLIC BLOOD PRESSURE: 109 MMHG | OXYGEN SATURATION: 99 % | DIASTOLIC BLOOD PRESSURE: 71 MMHG | TEMPERATURE: 98.3 F | RESPIRATION RATE: 17 BRPM | HEART RATE: 73 BPM

## 2022-10-03 DIAGNOSIS — F32.A DEPRESSION: ICD-10-CM

## 2022-10-03 DIAGNOSIS — F41.9 ANXIETY: ICD-10-CM

## 2022-10-03 DIAGNOSIS — F10.929 ALCOHOL INTOXICATION (H): ICD-10-CM

## 2022-10-03 LAB
ETHANOL SERPL-MCNC: 0.29 G/DL
LIPASE SERPL-CCNC: 127 U/L (ref 13–60)
MAGNESIUM SERPL-MCNC: 2.4 MG/DL (ref 1.7–2.3)

## 2022-10-03 PROCEDURE — 83690 ASSAY OF LIPASE: CPT | Performed by: PHYSICIAN ASSISTANT

## 2022-10-03 PROCEDURE — 83735 ASSAY OF MAGNESIUM: CPT | Performed by: PHYSICIAN ASSISTANT

## 2022-10-03 PROCEDURE — 90791 PSYCH DIAGNOSTIC EVALUATION: CPT

## 2022-10-03 PROCEDURE — 82077 ASSAY SPEC XCP UR&BREATH IA: CPT | Performed by: PHYSICIAN ASSISTANT

## 2022-10-03 PROCEDURE — 99285 EMERGENCY DEPT VISIT HI MDM: CPT | Mod: 25

## 2022-10-03 PROCEDURE — 36415 COLL VENOUS BLD VENIPUNCTURE: CPT | Performed by: PHYSICIAN ASSISTANT

## 2022-10-03 RX ORDER — HYDROXYZINE PAMOATE 25 MG/1
25 CAPSULE ORAL 3 TIMES DAILY PRN
Qty: 9 CAPSULE | Refills: 0 | Status: SHIPPED | OUTPATIENT
Start: 2022-10-03 | End: 2022-10-06

## 2022-10-03 ASSESSMENT — ENCOUNTER SYMPTOMS
ABDOMINAL PAIN: 0
BACK PAIN: 0
SHORTNESS OF BREATH: 0
NUMBNESS: 0
TROUBLE SWALLOWING: 0
EYE DISCHARGE: 0
FREQUENCY: 0
CHEST TIGHTNESS: 0
HEADACHES: 0
SORE THROAT: 0
SPEECH DIFFICULTY: 1
VOMITING: 0
CHILLS: 0
NAUSEA: 0
COUGH: 0
WEAKNESS: 0
NECK PAIN: 0
HEMATURIA: 0
WOUND: 0
DYSURIA: 0
FEVER: 0
DIARRHEA: 0

## 2022-10-03 ASSESSMENT — ACTIVITIES OF DAILY LIVING (ADL): ADLS_ACUITY_SCORE: 35

## 2022-10-03 NOTE — ED NOTES
"Dr. Cowan at bedside for assessment.  Patient denies any suicidal ideation at this time.  Requesting to be discharged home with Ativan- provider aware.  Patient states \"feeling good\" and refuses need for lab/IV placement.  Provider aware of refusal.  Per provider, patient needs ambulation trial and will call for his medical cab ride home.   "

## 2022-10-03 NOTE — ED NOTES
Bed: [facility]-15  Expected date:   Expected time:   Means of arrival:   Comments:  Room RP 5

## 2022-10-03 NOTE — DISCHARGE INSTRUCTIONS
Aftercare Plan  You were seen in the ED by Estelita, a mental health provider, due to alcohol use and a suicidal statement.   You declined other referrals. For today, you want to return home, sleep, and see what tomorrow brings.  We will be reaching out to you to see if you want any follow up as noted below. Please also see our recommendations below:    If I am feeling unsafe or I am in a crisis, I will:   Contact my doctor's office:[facility]  [phone]    Call the National Suicide Prevention Lifeline: 988  Go to the nearest emergency room   Call 911     Your [facility] has a mental health crisis team you can call 24/7: [facility]  [phone] (adults)  [phone] (children)    Additional resources and information: we'd recommend considering treatment for alcohol use and anxiety, as you have noted you have both and the alcohol only works for a little bit before it messes everything up. Detox and substance use resources are noted below:    Community NIRANJAN Evaluations: Clients may call their county for a full list of providers - Availability and services listed belo are subject to change, please call the provider to confirm    [facility]  [phone]  [address] [place] [address], [address]  *Please call the above number to schedule a comprehensive assessment for determination of level of care needs. In person and virtual appointments available Mon-Fri.    [facility]  [address] [place] [address], [address]  [facility]  For more information to to make a referral call:  [phone]  Walk-in on Fridays  9-11 a.m.    [facility]  [phone]  Locations: [facility], [facility], and [place]  *Walk in assessments availble M-F starting at 8 am -virtual only        If you are intoxicated, you may be required to detox at a detox facility before starting treatment. The following are  detox facilities that you can self present to. All detox facilities are able to help you complete an assessment prior to discharge if you choose:    [facility]: Arrive at a [facility] for immediate medical evaluation    [facility]: [address] [place] [address], [address].         [phone]          Additional Information  Today you were seen by a licensed mental health professional through Triage and Transition services, [facility] (P)  for a crisis assessment in the Emergency Department at [facility].  It is recommended that you follow up with your established providers (psychiatrist, mental health therapist, and/or primary care doctor - as relevant) as soon as possible. Coordinators from [facility] will be calling you in the next 24-48 hours to ensure that you have the resources you need.  You can also contact [facility] coordinators directly at [phone]. You may have been scheduled for or offered an appointment with a mental health provider. [facility] maintains an extensive network of licensed behavioral health providers to connect patients with the services they need.  We do not charge providers a fee to participate in our referral network.  We match patients with providers based on a patient's specific needs, insurance coverage, and location.  Our first effort will be to refer you to a provider within your care system, and will utilize providers outside your care system as needed.

## 2022-10-03 NOTE — ED TRIAGE NOTES
Pt arrived by [facility]   Medics report that pt was at a doctors appointment for Ketamine treatment. The pt was normal on arrival of the appointment. Pt received the treatment and was being monitored for approximately 2 hrs. During a recheck of the pt nurses noted that the pt had slurred speech and was slow to respond. Pt urinated on himself and was sitting in his urine soaked clothing.  Per medics, the pt was became belligerent with nursing staff. Pt admitted to medics to drinking a half liter of vodka following the ketamine treatment.     Medics states they were called out to treatment center. Pt made general suicidal statement to medics. Pt was placed on transport hold by [facility] on scene.     On arrival to ED, pt refused to answer questions and refused to change out of urine soaked clothing       Triage Assessment     Row Name 10/03/22 6229       Triage Assessment (Adult)    Airway WDL WDL       Respiratory WDL    Respiratory WDL WDL       Skin Circulation/Temperature WDL    Skin Circulation/Temperature WDL WDL       Cardiac WDL    Cardiac WDL WDL       Peripheral/Neurovascular WDL    Peripheral Neurovascular WDL WDL       Cognitive/Neuro/Behavioral WDL    Cognitive/Neuro/Behavioral WDL WDL

## 2022-10-03 NOTE — ED NOTES
Patient was cooperative with discharge instructions.  Given paper RX for Vistaril.  Patient was given meal to eat.  Ambulatory with steady gait out of department- instructed to wait in vestibule for cab ride home.  He verbalized understanding and was very grateful for his care today.

## 2022-10-03 NOTE — ED PROVIDER NOTES
EMERGENCY DEPARTMENT ENCOUNTER      NAME: Billy Calzada  AGE: 62 year old male  YOB: 1960  MRN: [id number]  EVALUATION DATE & TIME: 10/3/2022  1:26 PM    PCP: Leo Cardenas    ED PROVIDER: Sukhi Monsivais PA-C      Chief Complaint   Patient presents with     Alcohol Intoxication     Suicidal         FINAL IMPRESSION:  1. Alcohol intoxication (H)    2. Depression    3. Anxiety          MEDICAL DECISION MAKING:    Pertinent Labs & Imaging studies reviewed. (See chart for details)  62 year old male presents to the Emergency Department for evaluation of acute alcohol intoxication, with slurred speech.    After obtaining history present illness from the patient as well as ambulance crew plan to screen with labs, provide IV fluids.  Looking primarily for medical clearance with regards to the patient's alcoholism.    With regards the patient's comments of suicidal ideation, will have DEC  perform consult.  Currently he follows my conversation, there is some slurred speech, however he is awake and alert.  Definitely no signs of withdrawal, no tremors.  I informed the patient that because he reported these thoughts of suicide and a plan we will need to perform formal assessment and until then he would need to stay here in the emergency department.  Currently he is voluntary, however he would be holdable.  No family at the bedside.  No visible evidence of trauma therefore do not feel that emergent imaging is necessary.    DEC  did conclude that the patient is safe to discharge to home did not feel strongly that he needed to be kept here against as well for his suicidal thoughts.  Patient does have outpatient resources for outpatient alcohol treatment.  I will provide him with a small amount of hydroxyzine to use as needed for anxiety.  No evidence of acute alcohol withdrawal, did not feel comfortable sending him home with benzodiazepines.  At this point time he is comfortable discharging to  home.  We will provide medical wheelchair transport back to his apartment.  He can ambulate safely under his own power, no longer slurring words, is awake, alert, appropriate.  Some of his labs that hemolyzed and could not be reported however he did refuse further lab draws at this time.    ED COURSE  1:36 PM  I met with the patient, obtained history, performed an initial exam, and discussed options and plan for diagnostics and treatment here in the ED.    2:33 PM   I staffed the patient with Dr. Cowan.    2:54 PM  Spoke with the DEC . Discussed the patient's case. They will evaluate the patient once he is sober.    3:20 PM  Patient is refusing additional blood draws.    3:26 PM  Spoke with the DEC . They assessed the patient and feel the patient is safe to go home.    3:39 PM  Patient feels comfortable going home. He states he will need a ride home, but otherwise feels comfortable with the plan. We discussed the plan for discharge and the patient is agreeable. Reviewed supportive cares, symptomatic treatment, outpatient follow up, and reasons to return to the Emergency Department. Patient to be discharged by ED RN.    At the conclusion of the encounter I discussed the results of all of the tests and the disposition. The questions were answered. The patient or family acknowledged understanding and was agreeable with the care plan.     MEDICATIONS GIVEN IN THE EMERGENCY:  Medications   0.9% sodium chloride BOLUS (has no administration in time range)       NEW PRESCRIPTIONS STARTED AT TODAY'S ER VISIT  New Prescriptions    HYDROXYZINE (VISTARIL) 25 MG CAPSULE    Take 1 capsule (25 mg) by mouth 3 times daily as needed for itching            =================================================================    HPI    Patient information was obtained from: Patient    Use of Interpretor: N/A       Billy Calzada is a 62 year old male with a pertinent history of alcohol abuse, Afib with RVR, HLD, and  anxiety and depression who presents to this ED by EMS for evaluation of alcohol intoxication.    The patient reports he called 911 today because he is an alcoholic, drinks every day, and was concerned for his health. His last drink was this morning not long before he called 911. The patient reports he normally drinks about 1/2 a liter of vodka and a couple beers per day.    Ambulance crew report was that the patient was receiving ketamine infusion at his ketamine clinic.  It is reported that he receives this over the last couple months for management of chronic anxiety.  Staff there reported that when he presented he was acting appropriate but then during the infusion he became belligerent and appeared intoxicated.  He did admit to drinking vodka while there.  Ambulance was then called when he started slurring his speech and he was brought here for assessment.  Patient did verbalize suicidal ideation to ambulance crew.  When I addressed this with the patient he reports that he does think about suicide once a week, this is related to chronic depression and anxiety.  He states that his plan would be to take a large amount of benzodiazepines.  Currently does not have access to this.  The patient reports that his previous primary care provider was managing his long-term anxiety with Ativan, unfortunately he has retired and this patient no longer is receiving this therefore he is self-medicating with alcohol.  Patient has no reports of previous suicidal attempts, he denies acutely taking any Tylenol or aspirin or any additional medications prior to arrival.    He does not want to go to treatment or detox. He reports he has the knowledge and resources to stop drinking on his own, and if is medically cleared he would like to go home. His girlfriend will be able to pick him up.    He has not had any recent injuries or falls. He denies headache, chest pain, abdominal pain, nausea, vomiting, and any other complaints at this  time.      REVIEW OF SYSTEMS   Review of Systems   Constitutional: Negative for chills and fever.   HENT: Negative for congestion, sore throat and trouble swallowing.    Eyes: Negative for discharge and visual disturbance.   Respiratory: Negative for cough, chest tightness and shortness of breath.    Cardiovascular: Negative for chest pain and leg swelling.   Gastrointestinal: Negative for abdominal pain, diarrhea, nausea and vomiting.   Genitourinary: Negative for dysuria, frequency, hematuria and urgency.   Musculoskeletal: Negative for back pain, gait problem and neck pain.   Skin: Negative for rash and wound.   Neurological: Positive for speech difficulty (slurring). Negative for weakness, numbness and headaches.   Psychiatric/Behavioral: Positive for suicidal ideas. Negative for behavioral problems.        + intoxicated   All other systems reviewed and are negative.        PAST MEDICAL HISTORY:  Past Medical History:   Diagnosis Date     Alcohol abuse      Anxiety      Hyperlipidemia      Hypertension        PAST SURGICAL HISTORY:  No past surgical history on file.      CURRENT MEDICATIONS:      Current Facility-Administered Medications:      0.9% sodium chloride BOLUS, 1,000 mL, Intravenous, Once, Sukhi Monsivais PA-C    Current Outpatient Medications:      hydrOXYzine (VISTARIL) 25 MG capsule, Take 1 capsule (25 mg) by mouth 3 times daily as needed for itching, Disp: 9 capsule, Rfl: 0     atenolol (TENORMIN) 100 MG tablet, Take 100 mg by mouth daily., Disp: , Rfl:      atenolol (TENORMIN) 50 MG tablet, Take 2 tablets (100 mg) by mouth daily, Disp: 6 tablet, Rfl: 0     lorazepam (ATIVAN) 2 MG tablet, Take 2 mg by mouth 2 times daily as needed., Disp: , Rfl:      Multiple Vitamins-Minerals (MULTIVITAL PO), Take  by mouth., Disp: , Rfl:      simvastatin (ZOCOR) 20 MG tablet, Take 20 mg by mouth At Bedtime., Disp: , Rfl:      zolpidem (AMBIEN) 10 MG tablet, Take 1 tablet (10 mg) by mouth nightly as needed for  sleep, Disp: 10 tablet, Rfl: 0      ALLERGIES:  Allergies   Allergen Reactions     Iodine I 131 Tositumomab      Keflex [Cephalexin-Fd&C Yellow #6]        FAMILY HISTORY:  Family History   Problem Relation Age of Onset     Tremor Son      Tremor Son        SOCIAL HISTORY:   Social History     Socioeconomic History     Marital status:    Tobacco Use     Smoking status: Never Smoker     Tobacco comment: KEVEN   Substance and Sexual Activity     Alcohol use: Yes     Drug use: No   Social History Narrative    ** Merged History Encounter **         Lives alone in apartment. Has two children.       VITALS:  Patient Vitals for the past 24 hrs:   BP Temp Temp src Pulse Resp SpO2   10/03/22 1406 -- 98.3  F (36.8  C) Oral 64 18 95 %   10/03/22 1330 117/76 -- -- -- -- --       PHYSICAL EXAM    Physical Exam  Vitals and nursing note reviewed.   Constitutional:       General: He is not in acute distress.     Appearance: Normal appearance. He is not ill-appearing or toxic-appearing.   HENT:      Head: Normocephalic and atraumatic.      Right Ear: External ear normal.      Left Ear: External ear normal.      Nose: Nose normal.   Eyes:      General: No scleral icterus.     Extraocular Movements: Extraocular movements intact.      Conjunctiva/sclera: Conjunctivae normal.   Cardiovascular:      Rate and Rhythm: Normal rate and regular rhythm.   Pulmonary:      Effort: Pulmonary effort is normal. No respiratory distress.   Abdominal:      General: Abdomen is flat. Bowel sounds are normal.      Palpations: Abdomen is soft.      Tenderness: There is no abdominal tenderness.   Musculoskeletal:         General: No swelling, tenderness, deformity or signs of injury. Normal range of motion.      Cervical back: Normal range of motion.   Skin:     General: Skin is warm and dry.      Coloration: Skin is not jaundiced.      Findings: No bruising, erythema or rash.   Neurological:      General: No focal deficit present.      Mental Status:  He is alert and oriented to person, place, and time. Mental status is at baseline.      Sensory: No sensory deficit.      Motor: No weakness.   Psychiatric:         Mood and Affect: Mood normal.         Behavior: Behavior normal.         Judgment: Judgment normal.      Comments: Patient does exhibit some minor signs of intoxication, however he is able to carry conversation, answer my questions appropriately, is directable.  Did report suicidal ideation.          LAB:  All pertinent labs reviewed and interpreted.  Results for orders placed or performed during the hospital encounter of 10/03/22   Result Value Ref Range    Lipase 127 (H) 13 - 60 U/L   Result Value Ref Range    Magnesium 2.4 (H) 1.7 - 2.3 mg/dL   Ethyl Alcohol Level   Result Value Ref Range    Alcohol ethyl 0.29 (H) <=0.00 g/dL       RADIOLOGY:  Reviewed all pertinent imaging. Please see official radiology report.  No orders to display           Dejon WOO, am serving as a scribe to document services personally performed by Sukhi Monsivais PA-C based on my observation and the provider's statements to me. Sukhi WOO PA-C attest that Dejon Mcleod is acting in a scribe capacity, has observed my performance of the services and has documented them in accordance with my direction.    Sukhi Monsivais PA-C  Emergency Medicine  [facility]       Sukhi Monsivais PA-C  10/03/22 1608

## 2022-10-03 NOTE — CONSULTS
"10/3/2022  Billy Calzada 1960     Diagnostic Evaluation Consultation  Crisis Assessment    Patient was assessed: Jayla  Patient location: ED [facility]  Was a release of information signed: No. Reason: refused      Referral Data and Chief Complaint  Billy is a 62 year old, who uses he/him pronouns, and presents to the ED via EMS. Patient is referred to the ED by community provider(s). Patient is presenting to the ED for the following concerns: intoxication and urinating on himself at a ketamine clinic. Made a suicidal statement to EMS while enroute, so DEC assessment requested in addition to request for medical clearance re: alcohol use.      Informed Consent and Assessment Methods     Patient is his own guardian. Writer met with patient and explained the crisis assessment process, including applicable information disclosures and limits to confidentiality, assessed understanding of the process, and obtained consent to proceed with the assessment. Patient was observed to be able to participate in the assessment as evidenced by responding to questions, oriented, indicating tx preferences. Assessment methods included conducting a formal interview with patient, review of medical records, collaboration with medical staff, and obtaining relevant collateral information from family and community providers when available..     Over the course of this crisis assessment offered validation, engaged patient in problem solving and disposition planning and worked with patient on safety and aftercare planning. Patient's response to interventions was limited- he initially did not want to talk and said \"refused\" re the assessment, stating he wanted to go home.     I then explained that I am the person to talk to about going home and that generally we can do what a pt wants as long as they are not planning to hurt themselves or someone else, and can let us know what they want/why.     Pt was agreeable then and while continued to be " "guarded, opened up a little also.    He was slurring his words, but was oriented, and based on his reported drinking level - states this is his baseline. So, I proceeded with the assessment given it may give us a more accurate picture of his sx and needs than waiting until sober.     Summary of Patient Situation  Pt indicates understanding that making a suicidal statement = mental health eval.  He says he feels this way \"every so often\". Would never act on it. Has never acted on it. Reasons to live include: fall, the colors, wanting to be alive.  Reports if he were to kill himself he would do it via benzos but doesn't have them.  When asked re: sharps, guns, piles of glass at home he states \"of course\" but doesn't act on any SI because he has \"self control\".  \"No way' to detox, alcohol tx, or other mental health suggestions.  Says he drinks to manage anxiety but it doesn't work well long term.  Ketamine did help with depression he says.   Wants to go home, have a beer, smoke some weed, and figure out tomorrow tomorrow.    Brief Psychosocial History  Lives on his own. Does not drive. Takes medical transport via [employer] to app.  Older brother - only one left- is a MD and emergency contact - but does not want us to call him.  Denies having a girlfriend (was in a different note) or anyone else that could pick him up.     Significant Clinical History  Has been drinking since age 16.   Past hospitalizations - several years ago for alcohol use he reports- says he came into [facility] on his own.  Currently - sees PCP and ketamine clinic.  Used to manage on ativan too but md retired and that plan was stopped pt reports.     Collateral Information  No one pt wanted me to call. Reviewed medical record briefly.     Risk Assessment  ESS-6  1.a. Over the past 2 weeks, have you had thoughts of killing yourself? Yes  1.b. Have you ever attempted to kill yourself and, if yes, when did this last happen? No   2. Recent or current " suicide plan? No   3. Recent or current intent to act on ideation? No  4. Lifetime psychiatric hospitalization? Yes  5. Pattern of excessive substance use? Yes  6. Current irritability, agitation, or aggression? No  Scoring note: BOTH 1a and 1b must be yes for it to score 1 point, if both are not yes it is zero. All others are 1 point per number. If all questions 1a/1b - 6 are no, risk is negligible. If one of 1a/1b is yes, then risk is mild. If either question 2 or 3, but not both, is yes, then risk is automatically moderate regardless of total score. If both 2 and 3 are yes, risk is automatically high regardless of total score.      Score: 2, moderate risk      Does the patient have access to lethal means? Yes - describe does not specify. Also does not engage in means restriction despite me asking.     Does the patient engage in non-suicidal self-injurious behavior (NSSI/SIB)? no     Does the patient have thoughts of harming others? No     Is the patient engaging in sexually inappropriate behavior?  no        Current Substance Abuse     Is there recent substance abuse? BAL of .2+ at time of interview, and endorses MJ use     Was a urine drug screen or blood alcohol level obtained: Yes see above       Mental Status Exam     Affect: Blunted   Appearance: Appropriate    Attention Span/Concentration: Attentive  Eye Contact: Engaged   Fund of Knowledge: Appropriate    Language /Speech Content: Fluent   Language /Speech Volume: Normal    Language /Speech Rate/Productions: Normal and Slow    Recent Memory: Intact   Remote Memory: Intact   Mood: Irritable    Orientation to Person: Yes    Orientation to Place: Yes - thought we were at [facility]  Orientation to Time of Day: Yes    Orientation to Date: Yes    Situation (Do they understand why they are here?): Yes    Psychomotor Behavior: Normal    Thought Content: Clear and Other: denies SI now   Thought Form: Intact      History of commitment: No          "  Medication    Psychotropic medications:ketamine, lexapro  Medication changes made in the last two weeks: No       Current Care Team  PCP:Brandan Rosario,     [address] [place] [address]    [phone] (Work)    [phone] (Fax)      [facility] to [facility]. Does not want us contacting them.    Pt refused to provide any other provider info, but also denies therapy or other med manager.  .      Diagnosis    Substance-Related & Addictive Disorders Alcohol Intoxication  303.00 (F10.129) Alcohol Intoxication with use disorder, Moderate or severe   296.32 (F33.1) Major Depressive Disorder, Recurrent Episode, Moderate _ - by history   300.02 (F41.1) Generalized Anxiety Disorder - by history     Clinical Summary and Substantiation of Recommendations    After therapeutic assessment, intervention and aftercare planning by ED care team and LMHP and in consultation with attending provider, the patient's circumstances and mental state were appropriate for outpatient management. It is the recommendation of this clinician that pt discharge with OP MH support. Pt declines any additional referrals for such support or CD tx. A this time the pt is not presenting as an acute risk to self or others due to the following factors: denies past SI attempts, plan, or intent. Is oriented and denies psychosis or acute mental health sx. Endorses baseline alcohol use and anxiety and depression.      Disposition    Recommended disposition: Substance Abuse Disorder Treatment, Detox and Rule 25/NIRANJAN Assessment       Reviewed case and recommendations with attending provider. Attending Name: Sukhi Monsivais       Attending concurs with disposition: Yes       Patient concurs with disposition: No: declines       Guardian concurs with disposition: NA      Final disposition: Medication management and Other: declines any other referrals, plans to follow with pcp and find a new ketamine clinic.       Outpatient Details (if " applicable):   Aftercare plan and appointments placed in the AVS and provided to patient: Yes. Given to patient by ED staff    Was lethal means counseling provided as a part of aftercare planning? Yes - describe writer reviewed this with pt but he declined to engage in it. He did deny access to his one stated means of benzos.       Assessment Details    Patient interview started at: 3:12 and completed at: 3:25.     Total duration spent on the patient case in minutes: .75 hrs      CPT code(s) utilized: 36728 - Psychotherapy for Crisis - 60 (30-74*) min       Estelita Payne, LICSW, MSW, LICSW, LMHP  DEC - Triage & Transition Services  Callback: [phone]      Aftercare Plan  You were seen in the ED by Estelita, a mental health provider, due to alcohol use and a suicidal statement.   You declined other referrals. For today, you want to return home, sleep, and see what tomorrow brings.  We will be reaching out to you to see if you want any follow up as noted below. Please also see our recommendations below:    If I am feeling unsafe or I am in a crisis, I will:   Contact my doctor's office:[facility]  [phone]    Call the National Suicide Prevention Lifeline: 988  Go to the nearest emergency room   Call 911     Your [facility] has a mental health crisis team you can call 24/7: [facility]  [phone] (adults)  [phone] (children)    Additional resources and information: we'd recommend considering treatment for alcohol use and anxiety, as you have noted you have both and the alcohol only works for a little bit before it messes everything up. Detox and substance use resources are noted below:    Community NIRANJAN Evaluations: Clients may call their county for a full list of providers - Availability and services listed belo are subject to change, please call the provider to confirm    [facility]  [phone] [address] [place], [place], [address]  *Please call the  above number to schedule a comprehensive assessment for determination of level of care needs. In person and virtual appointments available Mon-Fri.    [facility]  [address] [place] [address], [phone]  [facility]  For more information to to make a referral call:  [phone]  Walk-in on Fridays  9-11 a.m.    [facility]  [phone]  Locations: [facility], [facility], and [place]  *Walk in assessments availble M-F starting at 8 am -virtual only        If you are intoxicated, you may be required to detox at a detox facility before starting treatment. The following are detox facilities that you can self present to. All detox facilities are able to help you complete an assessment prior to discharge if you choose:    [facility]: Arrive at a [facility] for immediate medical evaluation    [facility]: [address] [place] [address], [phone].         [phone]          Additional Information  Today you were seen by a licensed mental health professional through Triage and Transition services, [facility] ([facility])  for a crisis assessment in the Emergency Department at [facility].  It is recommended that you follow up with your established providers (psychiatrist, mental health therapist, and/or primary care doctor - as relevant) as soon as possible. Coordinators from [facility] will be calling you in the next 24-48 hours to ensure that you have the resources you need.  You can also contact [facility] coordinators directly at [phone]. You may have been scheduled for or offered an appointment with a mental health provider. [facility] maintains an extensive network of licensed behavioral health providers to connect patients with the services they need.  We do not charge providers a fee to participate in our referral network.  We match patients with providers based on a patient's specific  needs, insurance coverage, and location.  Our first effort will be to refer you to a provider within your care system, and will utilize providers outside your care system as needed.

## 2022-10-03 NOTE — ED NOTES
Bed: JNEDP-05  Expected date: 10/3/22  Expected time:   Means of arrival: Ambulance  Comments:  Hemant  [address]  ETOH

## 2022-10-03 NOTE — ED NOTES
I, Rehana Cowan MD have reviewed the documentation, personally taken the patient's history, performed an exam and agree with the physical finds, diagnosis and management plan.  I saw this patient with Victor M Monsivais PA-C.  ED Course as of 10/03/22 1542   Mon Oct 03, 2022   1433 I discussed the case with Victor M Monsivais PA-C.    1539 Patient is a 62-year-old male who came in today with alcohol intoxication.  It sounds like he was at clinic getting ketamine for his anxiety.  He then drink a bunch of alcohol while he is there and the nurses there noticed some slurred speech and he had urinated himself.  He was brought in here for further evaluation.  By the time I saw him he was fairly clinically sober.  He was talking with clear speech.  He was awake and alert and answering my questions appropriately.  We can ambulate him and make sure he is steady on his feet but he would like to go home and I think that is reasonable.  He does not have somebody that he can call but he thought medical cab would be a good idea.  I think that is fine.  We can send him a little hydroxyzine prescription for anxiety as needed.  He was requesting benzos but I do not think that is a good idea.  He is not suicidal at this time.  He was assessed by DEC because he did make some suicidal statements earlier.  As he sobered up that has resolved.  Patient will be discharged home shortly.       I personally saw the patient and performed a substantive portion of the visit including all aspects of the medical decision making.       Rehana Cowan MD  10/03/22 0730

## 2022-10-03 NOTE — ED NOTES
Pt needs redraw of green and purple top. Pt is currently refusing further IV attempts and blood draws. Will let MD know

## 2022-10-03 NOTE — ED NOTES
"Pt was read food menu and lunch was ordered. Pt stating, \"I'm going to leave tomorrow and get some vodka\". \" I want a drink, a smoke and my marijuana\".   Pt told he could not have those things, but lunch was ordered . Pt also states \"I'm going to leave and no one can stop me\". Nurse aware.  "

## 2022-10-03 NOTE — ED NOTES
"Nurse went into room and asked permission to start IV and obtain blood samples. Pt tells nurse \"im good\" and did not want IV or blood draw done. Nurse talked with pt, pt verbalized wanting to go home. Pt eventually agreed to IV and blood draw because it is the first steps to getting out of here.   Iv access not successful but was able to get blood samples sent to lab  "

## 2022-10-03 NOTE — CONSULTS
Writer spoke with pt MD Monsivais.  Requested that we defer on interview until BAL is known (while report he is functioning ok perhaps due to longstanding alcohol intake - it is still helpful), as we can't finish an assessment well without that info. Please resubmit when obtained.

## 2023-03-28 ENCOUNTER — HOSPITAL ENCOUNTER (EMERGENCY)
Facility: CLINIC | Age: 63
Discharge: HOME | End: 2023-03-28
Attending: EMERGENCY MEDICINE | Admitting: EMERGENCY MEDICINE
Payer: COMMERCIAL

## 2023-03-28 VITALS
HEART RATE: 74 BPM | DIASTOLIC BLOOD PRESSURE: 91 MMHG | SYSTOLIC BLOOD PRESSURE: 137 MMHG | RESPIRATION RATE: 18 BRPM | HEIGHT: 73 IN | WEIGHT: 185 LBS | TEMPERATURE: 98 F | BODY MASS INDEX: 24.52 KG/M2
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
## 2023-03-28 DIAGNOSIS — S01.511A LIP LACERATION, INITIAL ENCOUNTER: ICD-10-CM

## 2023-03-28 DIAGNOSIS — S09.93XA FACIAL INJURY, INITIAL ENCOUNTER: ICD-10-CM

## 2023-03-28 PROCEDURE — 99283 EMERGENCY DEPT VISIT LOW MDM: CPT | Performed by: EMERGENCY MEDICINE

## 2023-03-28 PROCEDURE — 250N000013 HC RX MED GY IP 250 OP 250 PS 637: Performed by: EMERGENCY MEDICINE

## 2023-03-28 RX ORDER — ACETAMINOPHEN 500 MG
1000 TABLET ORAL ONCE
Status: COMPLETED | OUTPATIENT
Start: 2023-03-28 | End: 2023-03-28

## 2023-03-28 RX ADMIN — ACETAMINOPHEN 1000 MG: 500 TABLET ORAL at 02:09

## 2023-03-28 ASSESSMENT — ACTIVITIES OF DAILY LIVING (ADL)
ADLS_ACUITY_SCORE: 35
ADLS_ACUITY_SCORE: 35
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
## 2023-06-21 ENCOUNTER — HOSPITAL ENCOUNTER (EMERGENCY)
Facility: CLINIC | Age: 63
Discharge: HOME | End: 2023-06-22
Attending: EMERGENCY MEDICINE | Admitting: NURSE PRACTITIONER
Payer: COMMERCIAL

## 2023-06-21 DIAGNOSIS — F10.929 ALCOHOL INTOXICATION (H): ICD-10-CM

## 2023-06-21 LAB
ALBUMIN SERPL BCG-MCNC: 4.4 G/DL (ref 3.5–5.2)
ALP SERPL-CCNC: 70 U/L (ref 40–129)
ALT SERPL W P-5'-P-CCNC: 33 U/L (ref 0–70)
AMPHETAMINES UR QL SCN: ABNORMAL
ANION GAP SERPL CALCULATED.3IONS-SCNC: 15 MMOL/L (ref 7–15)
AST SERPL W P-5'-P-CCNC: 31 U/L (ref 0–45)
BARBITURATES UR QL SCN: ABNORMAL
BASOPHILS # BLD AUTO: 0.1 10E3/UL (ref 0–0.2)
BASOPHILS NFR BLD AUTO: 2 %
BENZODIAZ UR QL SCN: ABNORMAL
BILIRUB SERPL-MCNC: 0.6 MG/DL
BUN SERPL-MCNC: 12.4 MG/DL (ref 8–23)
BZE UR QL SCN: ABNORMAL
CALCIUM SERPL-MCNC: 9.2 MG/DL (ref 8.8–10.2)
CANNABINOIDS UR QL SCN: ABNORMAL
CHLORIDE SERPL-SCNC: 105 MMOL/L (ref 98–107)
CREAT SERPL-MCNC: 1.2 MG/DL (ref 0.67–1.17)
DEPRECATED HCO3 PLAS-SCNC: 19 MMOL/L (ref 22–29)
EOSINOPHIL # BLD AUTO: 0.1 10E3/UL (ref 0–0.7)
EOSINOPHIL NFR BLD AUTO: 2 %
ERYTHROCYTE [DISTWIDTH] IN BLOOD BY AUTOMATED COUNT: 13.9 % (ref 10–15)
ETHANOL SERPL-MCNC: 0.39 G/DL
GFR SERPL CREATININE-BSD FRML MDRD: 69 ML/MIN/1.73M2
GLUCOSE BLDC GLUCOMTR-MCNC: 109 MG/DL (ref 70–99)
GLUCOSE SERPL-MCNC: 104 MG/DL (ref 70–99)
HCT VFR BLD AUTO: 41.7 % (ref 40–53)
HGB BLD-MCNC: 14 G/DL (ref 13.3–17.7)
HOLD SPECIMEN: NORMAL
HOLD SPECIMEN: NORMAL
IMM GRANULOCYTES # BLD: 0 10E3/UL
IMM GRANULOCYTES NFR BLD: 0 %
LYMPHOCYTES # BLD AUTO: 2.8 10E3/UL (ref 0.8–5.3)
LYMPHOCYTES NFR BLD AUTO: 35 %
MCH RBC QN AUTO: 33.3 PG (ref 26.5–33)
MCHC RBC AUTO-ENTMCNC: 33.6 G/DL (ref 31.5–36.5)
MCV RBC AUTO: 99 FL (ref 78–100)
MONOCYTES # BLD AUTO: 1 10E3/UL (ref 0–1.3)
MONOCYTES NFR BLD AUTO: 12 %
NEUTROPHILS # BLD AUTO: 4.1 10E3/UL (ref 1.6–8.3)
NEUTROPHILS NFR BLD AUTO: 49 %
NRBC # BLD AUTO: 0 10E3/UL
NRBC BLD AUTO-RTO: 0 /100
OPIATES UR QL SCN: ABNORMAL
PLATELET # BLD AUTO: 246 10E3/UL (ref 150–450)
POTASSIUM SERPL-SCNC: 4.2 MMOL/L (ref 3.4–5.3)
PROT SERPL-MCNC: 7.4 G/DL (ref 6.4–8.3)
RBC # BLD AUTO: 4.21 10E6/UL (ref 4.4–5.9)
SARS-COV-2 RNA RESP QL NAA+PROBE: NEGATIVE
SODIUM SERPL-SCNC: 139 MMOL/L (ref 136–145)
WBC # BLD AUTO: 8.1 10E3/UL (ref 4–11)

## 2023-06-21 PROCEDURE — 36415 COLL VENOUS BLD VENIPUNCTURE: CPT | Performed by: NURSE PRACTITIONER

## 2023-06-21 PROCEDURE — 80307 DRUG TEST PRSMV CHEM ANLYZR: CPT | Performed by: NURSE PRACTITIONER

## 2023-06-21 PROCEDURE — 99284 EMERGENCY DEPT VISIT MOD MDM: CPT | Performed by: NURSE PRACTITIONER

## 2023-06-21 PROCEDURE — 96360 HYDRATION IV INFUSION INIT: CPT | Mod: 59 | Performed by: NURSE PRACTITIONER

## 2023-06-21 PROCEDURE — 80053 COMPREHEN METABOLIC PANEL: CPT | Performed by: NURSE PRACTITIONER

## 2023-06-21 PROCEDURE — 250N000013 HC RX MED GY IP 250 OP 250 PS 637: Performed by: EMERGENCY MEDICINE

## 2023-06-21 PROCEDURE — 85025 COMPLETE CBC W/AUTO DIFF WBC: CPT | Performed by: NURSE PRACTITIONER

## 2023-06-21 PROCEDURE — 258N000003 HC RX IP 258 OP 636: Performed by: NURSE PRACTITIONER

## 2023-06-21 PROCEDURE — 99285 EMERGENCY DEPT VISIT HI MDM: CPT | Mod: 25 | Performed by: NURSE PRACTITIONER

## 2023-06-21 PROCEDURE — 82962 GLUCOSE BLOOD TEST: CPT

## 2023-06-21 PROCEDURE — 82077 ASSAY SPEC XCP UR&BREATH IA: CPT | Performed by: NURSE PRACTITIONER

## 2023-06-21 PROCEDURE — 250N000013 HC RX MED GY IP 250 OP 250 PS 637: Performed by: NURSE PRACTITIONER

## 2023-06-21 PROCEDURE — 96361 HYDRATE IV INFUSION ADD-ON: CPT | Performed by: NURSE PRACTITIONER

## 2023-06-21 PROCEDURE — 87635 SARS-COV-2 COVID-19 AMP PRB: CPT | Performed by: NURSE PRACTITIONER

## 2023-06-21 RX ORDER — FOLIC ACID 1 MG/1
1 TABLET ORAL DAILY
Status: DISCONTINUED | OUTPATIENT
Start: 2023-06-21 | End: 2023-06-21

## 2023-06-21 RX ORDER — DIAZEPAM 5 MG
10 TABLET ORAL EVERY 30 MIN PRN
Status: DISCONTINUED | OUTPATIENT
Start: 2023-06-21 | End: 2023-06-22 | Stop reason: HOSPADM

## 2023-06-21 RX ORDER — OLANZAPINE 5 MG/1
5-10 TABLET, ORALLY DISINTEGRATING ORAL EVERY 6 HOURS PRN
Status: DISCONTINUED | OUTPATIENT
Start: 2023-06-21 | End: 2023-06-22 | Stop reason: HOSPADM

## 2023-06-21 RX ORDER — FOLIC ACID 1 MG/1
1 TABLET ORAL DAILY
Status: DISCONTINUED | OUTPATIENT
Start: 2023-06-22 | End: 2023-06-22 | Stop reason: HOSPADM

## 2023-06-21 RX ORDER — HALOPERIDOL 5 MG/ML
2.5-5 INJECTION INTRAMUSCULAR EVERY 6 HOURS PRN
Status: DISCONTINUED | OUTPATIENT
Start: 2023-06-21 | End: 2023-06-22

## 2023-06-21 RX ORDER — MULTIPLE VITAMINS W/ MINERALS TAB 9MG-400MCG
1 TAB ORAL DAILY
Status: DISCONTINUED | OUTPATIENT
Start: 2023-06-22 | End: 2023-06-22 | Stop reason: HOSPADM

## 2023-06-21 RX ORDER — CLONIDINE HYDROCHLORIDE 0.1 MG/1
0.1 TABLET ORAL EVERY 8 HOURS
Status: DISCONTINUED | OUTPATIENT
Start: 2023-06-21 | End: 2023-06-22 | Stop reason: HOSPADM

## 2023-06-21 RX ORDER — DIAZEPAM 10 MG/2ML
5-10 INJECTION, SOLUTION INTRAMUSCULAR; INTRAVENOUS EVERY 30 MIN PRN
Status: DISCONTINUED | OUTPATIENT
Start: 2023-06-21 | End: 2023-06-22 | Stop reason: HOSPADM

## 2023-06-21 RX ORDER — FLUMAZENIL 0.1 MG/ML
0.2 INJECTION, SOLUTION INTRAVENOUS
Status: DISCONTINUED | OUTPATIENT
Start: 2023-06-21 | End: 2023-06-22 | Stop reason: HOSPADM

## 2023-06-21 RX ADMIN — FOLIC ACID 1 MG: 1 TABLET ORAL at 20:32

## 2023-06-21 RX ADMIN — CLONIDINE HYDROCHLORIDE 0.1 MG: 0.1 TABLET ORAL at 23:58

## 2023-06-21 RX ADMIN — THIAMINE HCL TAB 100 MG 100 MG: 100 TAB at 20:32

## 2023-06-21 RX ADMIN — SODIUM CHLORIDE 1000 ML: 9 INJECTION, SOLUTION INTRAVENOUS at 20:22

## 2023-06-21 RX ADMIN — DIAZEPAM 10 MG: 5 TABLET ORAL at 23:58

## 2023-06-21 ASSESSMENT — ACTIVITIES OF DAILY LIVING (ADL)
ADLS_ACUITY_SCORE: 35
ADLS_ACUITY_SCORE: 35

## 2023-06-22 VITALS
RESPIRATION RATE: 16 BRPM | SYSTOLIC BLOOD PRESSURE: 162 MMHG | DIASTOLIC BLOOD PRESSURE: 95 MMHG | TEMPERATURE: 97.7 F | OXYGEN SATURATION: 98 % | HEART RATE: 82 BPM | HEIGHT: 72 IN | BODY MASS INDEX: 25.06 KG/M2 | WEIGHT: 185 LBS

## 2023-06-22 PROCEDURE — 96372 THER/PROPH/DIAG INJ SC/IM: CPT | Performed by: EMERGENCY MEDICINE

## 2023-06-22 PROCEDURE — 90791 PSYCH DIAGNOSTIC EVALUATION: CPT

## 2023-06-22 PROCEDURE — 250N000011 HC RX IP 250 OP 636: Mod: JZ | Performed by: EMERGENCY MEDICINE

## 2023-06-22 PROCEDURE — 250N000013 HC RX MED GY IP 250 OP 250 PS 637: Performed by: EMERGENCY MEDICINE

## 2023-06-22 RX ORDER — OLANZAPINE 10 MG/2ML
10 INJECTION, POWDER, FOR SOLUTION INTRAMUSCULAR ONCE
Status: COMPLETED | OUTPATIENT
Start: 2023-06-22 | End: 2023-06-22

## 2023-06-22 RX ADMIN — CLONIDINE HYDROCHLORIDE 0.1 MG: 0.1 TABLET ORAL at 15:56

## 2023-06-22 RX ADMIN — THIAMINE HCL TAB 100 MG 100 MG: 100 TAB at 08:34

## 2023-06-22 RX ADMIN — Medication 1 TABLET: at 08:34

## 2023-06-22 RX ADMIN — OLANZAPINE 10 MG: 10 INJECTION, POWDER, FOR SOLUTION INTRAMUSCULAR at 05:35

## 2023-06-22 RX ADMIN — FOLIC ACID 1 MG: 1 TABLET ORAL at 08:33

## 2023-06-22 RX ADMIN — CLONIDINE HYDROCHLORIDE 0.1 MG: 0.1 TABLET ORAL at 08:33

## 2023-06-22 RX ADMIN — DIAZEPAM 10 MG: 5 TABLET ORAL at 01:14

## 2023-06-22 ASSESSMENT — COLUMBIA-SUICIDE SEVERITY RATING SCALE - C-SSRS
1. HAVE YOU WISHED YOU WERE DEAD OR WISHED YOU COULD GO TO SLEEP AND NOT WAKE UP?: YES
1. IN THE PAST MONTH, HAVE YOU WISHED YOU WERE DEAD OR WISHED YOU COULD GO TO SLEEP AND NOT WAKE UP?: YES
TOTAL  NUMBER OF ABORTED OR SELF INTERRUPTED ATTEMPTS LIFETIME: NO
ATTEMPT LIFETIME: NO
TOTAL  NUMBER OF ABORTED OR SELF INTERRUPTED ATTEMPTS SINCE LAST CONTACT: NO
TOTAL  NUMBER OF INTERRUPTED ATTEMPTS LIFETIME: NO
2. HAVE YOU ACTUALLY HAD ANY THOUGHTS OF KILLING YOURSELF?: NO
2. HAVE YOU ACTUALLY HAD ANY THOUGHTS OF KILLING YOURSELF?: NO
REASONS FOR IDEATION LIFETIME: COMPLETELY TO GET ATTENTION, REVENGE, OR A REACTION FROM OTHERS
ATTEMPT SINCE LAST CONTACT: NO
6. HAVE YOU EVER DONE ANYTHING, STARTED TO DO ANYTHING, OR PREPARED TO DO ANYTHING TO END YOUR LIFE?: NO
6. HAVE YOU EVER DONE ANYTHING, STARTED TO DO ANYTHING, OR PREPARED TO DO ANYTHING TO END YOUR LIFE?: NO
REASONS FOR IDEATION PAST MONTH: COMPLETELY TO GET ATTENTION, REVENGE, OR A REACTION FROM OTHERS
1. SINCE LAST CONTACT, HAVE YOU WISHED YOU WERE DEAD OR WISHED YOU COULD GO TO SLEEP AND NOT WAKE UP?: NO
TOTAL  NUMBER OF INTERRUPTED ATTEMPTS SINCE LAST CONTACT: NO

## 2023-06-22 ASSESSMENT — ACTIVITIES OF DAILY LIVING (ADL)
ADLS_ACUITY_SCORE: 35

## 2023-06-22 NOTE — CONSULTS
6/21/2023  Billy Calzada 1960     Writer consulted with ED provider, Jim MESSINA,  on this date at 1:28 PM. It was determined that pt would not benefit from assessment at this time due to Pt has already had an initial DEC assessment, ED has been referred to [facility] for ongoing support while in the ED.    ED will call DEC at [phone] when pt is ready and able to participate in assessment.     OR DEC order has been closed at this time.    Hallie Gordillo

## 2023-06-22 NOTE — ED NOTES
Emergency Department Patient Sign-out       Brief HPI:  This is a 63 year old male signed out to me by Dr. Cruz .  See initial ED Provider note for details of the presentation.            Significant Events prior to my assuming care: alcohol intoxication and agitated that required zyprexan IM      Exam:   Patient Vitals for the past 24 hrs:   BP Temp Temp src Pulse Resp SpO2 Height Weight   06/22/23 1246 (!) 140/81 97.7  F (36.5  C) Oral 79 -- 95 % -- --   06/22/23 0824 107/63 97.5  F (36.4  C) Oral 64 -- 97 % 1.829 m (6') 83.9 kg (185 lb)   06/22/23 0633 113/59 -- -- -- 18 94 % -- --   06/22/23 0630 113/59 -- -- 73 -- -- -- --   06/21/23 2245 111/75 -- -- 83 -- 96 % -- --   06/21/23 2230 -- -- -- 77 -- 97 % -- --   06/21/23 2215 116/88 -- -- 80 -- 98 % -- --   06/21/23 2200 116/87 -- -- 80 -- 96 % -- --   06/21/23 2145 100/68 -- -- 74 -- 95 % -- --   06/21/23 2130 -- -- -- 75 -- 98 % -- --   06/21/23 2115 93/65 -- -- 72 -- 94 % -- --   06/21/23 2100 97/64 -- -- 69 -- 95 % -- --   06/21/23 2045 100/64 -- -- 70 -- 94 % -- --   06/21/23 2020 91/61 -- -- 64 -- 94 % -- --   06/21/23 2015 100/71 -- -- 61 -- 94 % -- --   06/21/23 2000 96/63 -- -- 65 26 96 % -- --           ED RESULTS:   Results for orders placed or performed during the hospital encounter of 06/21/23 (from the past 24 hour(s))   [facility] ([facility]) Assessment Consult Order:     Status: None ()    Collection Time: 06/21/23  7:56 PM    Narrative    Kavya Ennis, HEIDY, DOMINICK     6/22/2023  6:22 AM  Diagnostic Evaluation Consultation  Crisis Assessment    Patient was assessed: Jayla  Patient location: declocations: [facility] Emergency   Department  Was a release of information signed: No. Reason: did not witness      Referral Data and Chief Complaint  Billy is a 63 year old, who uses he/him pronouns, and presents to   the ED via EMS. Patient is referred to the ED by other: EMS.   Patient is presenting to the ED for the following  "concerns: Pt   presents for alcohol intoxication and suicide ideation.      Informed Consent and Assessment Methods     Patient is his own guardian. Writer met with patient and   explained the crisis assessment process, including applicable   information disclosures and limits to confidentiality, assessed   understanding of the process, and obtained consent to proceed   with the assessment. Patient was observed to be able to   participate in the assessment as evidenced by alert and oriented.   Assessment methods included conducting a formal interview with   patient, review of medical records, collaboration with medical   staff, and obtaining relevant collateral information from family   and community providers when available..     Over the course of this crisis assessment provided reassurance,   offered validation, engaged patient in problem solving and   disposition planning, worked with patient on safety and aftercare   planning and provided psychoeducation. Patient's response to   interventions was pt was no open to any interventions.     Summary of Patient Situation     Pt presents for alcohol intoxication and suicide ideation. Pt   reports he does not recall being brought in by EMS. Pt reports he   is experiencing suicide ideation, \"I don't care about myself.\" Pt   reports he does not know why he doesn't care about himself. Pt   reports he has a plan to kill himself and when asked if he has a   plan, \"I don't have one. I have ketamine treatment.\" Per chart   review, pt has been in ketamine treatment for over a year, but pt   states this is his first time. When asked about this, pt states,   \"I'm in the process of getting back into it.\" Pt denies having   access to weapons or guns or drugs at home he can use to harm   himself. Pt denies current hallucinations or delusions. Pt denies   intent to kill himself. Pt reports history of suicide ideation   and one suicide attempt 20 years ago via overdose on medication. " "  When pt was asked to describe the nature of his suicide ideation,   he states, \"I am not suicidal.\" Pt was asked what changed, pt   states, \"I don't know.\" Pt refused to be scheduled for any   outpatient services or chemical health assessment, \"been there,   done that.\" Pt reports his safety plan is to use his primary   doctor and continue with his medication. Pt presents as alert,   oriented, minimally responsive, guarded, irritable, and   inconsistent in his assessment. Pt appears to be functioning at   his baseline, due to significant ED visits for alcohol   intoxication and history of suicide ideation when intoxicated.     Brief Psychosocial History     Pt reports he lives on his own and is retired. Pt denies   education involvement, legal issues, spiritual/cultural concerns,   and  status. Pt reports the following hobbies: \"hiking,   going for walks\". Pt reports having no supports and reports he   does not want us to contact his brother, who is listed as his   emergency contact. Pt reports his brother is no longer supportive   to him.     Significant Clinical History     Pt reports history of anxiety, depression, and alcohol abuse. Pt   denies history of trauma. Pt refused to disclose chemical health   treatment history. Pt denies history of inpatient mental health   treatment. Per chart review, pt has a history of detox, chemical   health treatment and significant ED visits for alcohol   intoxication, with history of suicide ideation when intoxicated.      Collateral Information  Pt refused consent for contacting his brother. Pt reports he is   on his own and has no supports.      Risk Assessment  [place] Suicide Severity Rating Scale Full Clinical Version:   6/22/2023  Suicidal Ideation  1. Wish to be Dead (Lifetime): Yes  1. Wish to be Dead (Past 1 Month): Yes  2. Non-Specific Active Suicidal Thoughts (Lifetime): No  Intensity of Ideation  Most Severe Ideation Rating (Lifetime): 1  Most Severe " Ideation Rating (Past 1 Month): 1  Frequency (Lifetime): Less than once a week  Frequency (Past 1 Month): Less than once a week  Duration (Lifetime): Fleeting, few seconds or minutes  Duration (Past 1 Month): Fleeting, few seconds or minutes  Controllability (Lifetime): Can control thoughts with little   difficulty  Controllability (Past 1 Month): Can control thoughts with little   difficulty  Deterrents (Lifetime): Deterrents definitely stopped you from   attempting suicide  Deterrents (Past 1 Month): Deterrents definitely stopped you from   attempting suicide  Reasons for Ideation (Lifetime): Completely to get attention,   revenge, or a reaction from others  Reasons for Ideation (Past 1 Month): Completely to get attention,   revenge, or a reaction from others  Suicidal Behavior  Actual Attempt (Lifetime): No  Has subject engaged in non-suicidal self-injurious behavior?   (Lifetime): No  Interrupted Attempts (Lifetime): No  Aborted or Self-Interrupted Attempt (Lifetime): No  Preparatory Acts or Behavior (Lifetime): No  C-SSRS Risk (Lifetime/Recent)  Calculated C-SSRS Risk Score (Lifetime/Recent): Low Risk    [place] Suicide Severity Rating Scale Since Last Contact:        Validity of evaluation is impacted by presenting factors during   interview.   Comments regarding subjective versus objective responses to   [place] tool: pt may still be intoxicated. Last LAUREN was on   6/21/2023 at 8:54PM. Time of assessment was 6/22/2023 at 3:41AM  Environmental or Psychosocial Events: challenging interpersonal   relationships, geographic isolation from supports,   impulsivity/recklessness, excessive debt, poor finances and   ongoing abuse of substances  Chronic Risk Factors: history of psychiatric hospitalization and   history of attachment issues   Warning Signs: seeking access to means to hurt or kill self,   talking or writing about death, dying, or suicide, hopelessness,   pain (new or worsening), rage, anger, seeking  revenge, acting   reckless or engaging in risky activities, feeling trapped, like   there is no way out, increasing substance use or abuse,   withdrawing from friends, family, and society, anxiety,   agitation, unable to sleep, sleeping all the time, dramatic   changes in mood, no reason for living, no sense of purpose in   life and engaging in self-destructive behavior  Protective Factors: lives in a responsibly safe and stable   environment and help seeking  Interpretation of Risk Scoring, Risk Mitigation Interventions and   Safety Plan:  Pt risk indicated as low. Pt denies SI/SIB/SA/HI and denies   plans, means, or intent to harm himself or others. Pt denies   current hallucinations or delusions. Earlier in assessment pt did   admit to suicide ideation. Per chart review, pt presents with SI   while intoxicated and, once sober, denies SI.     Does the patient have thoughts of harming others? No     Is the patient engaging in sexually inappropriate behavior?  no        Current Substance Abuse     Is there recent substance abuse? reports daily alcohol and   marijuana. reports sporadic use, last used alcohol yesterday and   marijuana in the past week     Was a urine drug screen or blood alcohol level obtained: Yes   positive for cannabis, LAUREN .39 @ 8:54PM on 6/21/2023       Mental Status Exam     Affect: Flat   Appearance: Appropriate    Attention Span/Concentration: Attentive  Eye Contact: Avoidant   Fund of Knowledge: Delayed    Language /Speech Content: Fluent   Language /Speech Volume: Normal    Language /Speech Rate/Productions: Minimally Responsive    Recent Memory: Variable   Remote Memory: Intact   Mood: Irritable    Orientation to Person: Yes    Orientation to Place: Yes   Orientation to Time of Day: Yes    Orientation to Date: Yes    Situation (Do they understand why they are here?): Yes    Psychomotor Behavior: Normal    Thought Content: Suicidal   Thought Form: Intact      History of commitment: No        Medication    Psychotropic medications: Yes. Pt is currently taking see HPI.   Medication compliant: Yes. Recent medication changes: No  Medication changes made in the last two weeks: No       Current Care Team    Primary Care Provider: Yes, refused to share name  Psychiatrist: No  Therapist: No  : No     CTSS or ARMHS: No  ACT Team: No  Other: No      Diagnosis    Major depressive disorder, Recurrent episode, Moderate - (F33.1)  Alcohol use disorder, Severe - (F10.20)  Cannabis use disorder, Moderate - (F12.20)    Clinical Summary and Substantiation of Recommendations    A lower level of care has been unsuccessful in treating and   stabilizing patient s mental health symptoms because of pt   inconsistent report regarding suicide ideation and poor safety   planning. However, with brief observation, monitored therapeutic   treatment, and intervention of mental health symptoms in the ED,   symptoms may be mitigated with potential for disposition to a   less restrictive level of care than an inpatient setting. Patient   is not currently on the inpatient worklist. Next steps in care   include provider would like pt to be reassessed for suicide   ideation after given more time to regained sobriety. Extended   Care will follow, working to address inconsistent suicide   ideation report in initial assessment and pt poor safety   planning/refusal for interventions.    Disposition    Recommended disposition: Other: Observation       Reviewed case and recommendations with attending provider.   Attending Name: Dr. Cruz       Attending concurs with disposition: Yes       Patient and/or validated legal guardian concurs with disposition:   No: pt refuses interventions       Final disposition: Other: Observation.       Assessment Details    Patient interview started at: 3:41AM and completed at: 3:59AM.     Total time spent with the patient or their family: .25 hrs      CPT code(s) utilized: 33397 - Psychotherapy  for Crisis - 60   (30-74*) min       Kavya Ennis, LPCC, LADC, MS, LPCC, LADC, Psychotherapist  DEC - Triage & Transition Services  Callback: [phone]             CBC with platelets differential     Status: Abnormal    Collection Time: 06/21/23  8:00 PM    Narrative    The following orders were created for panel order CBC with platelets differential.  Procedure                               Abnormality         Status                     ---------                               -----------         ------                     CBC with platelets and d...[[id number]]  Abnormal            Final result                 Please view results for these tests on the individual orders.   Comprehensive metabolic panel     Status: Abnormal    Collection Time: 06/21/23  8:00 PM   Result Value Ref Range    Sodium 139 136 - 145 mmol/L    Potassium 4.2 3.4 - 5.3 mmol/L    Chloride 105 98 - 107 mmol/L    Carbon Dioxide (CO2) 19 (L) 22 - 29 mmol/L    Anion Gap 15 7 - 15 mmol/L    Urea Nitrogen 12.4 8.0 - 23.0 mg/dL    Creatinine 1.20 (H) 0.67 - 1.17 mg/dL    Calcium 9.2 8.8 - 10.2 mg/dL    Glucose 104 (H) 70 - 99 mg/dL    Alkaline Phosphatase 70 40 - 129 U/L    AST 31 0 - 45 U/L    ALT 33 0 - 70 U/L    Protein Total 7.4 6.4 - 8.3 g/dL    Albumin 4.4 3.5 - 5.2 g/dL    Bilirubin Total 0.6 <=1.2 mg/dL    GFR Estimate 69 >60 mL/min/1.73m2   Ethyl Alcohol Level     Status: Abnormal    Collection Time: 06/21/23  8:00 PM   Result Value Ref Range    Alcohol ethyl 0.39 (HH) <=0.01 g/dL   [place] Draw     Status: None (In process)    Collection Time: 06/21/23  8:00 PM    Narrative    The following orders were created for panel order [place] Draw.  Procedure                               Abnormality         Status                     ---------                               -----------         ------                     Extra Blue Top Tube[[id number]]                              Final result               Extra Red Top Tube[[id number]]                                Final result               Extra Green Top (Lithium...[[id number]]                                                 Extra Purple Top Tube[[id number]]                                                         Please view results for these tests on the individual orders.   CBC with platelets and differential     Status: Abnormal    Collection Time: 06/21/23  8:00 PM   Result Value Ref Range    WBC Count 8.1 4.0 - 11.0 10e3/uL    RBC Count 4.21 (L) 4.40 - 5.90 10e6/uL    Hemoglobin 14.0 13.3 - 17.7 g/dL    Hematocrit 41.7 40.0 - 53.0 %    MCV 99 78 - 100 fL    MCH 33.3 (H) 26.5 - 33.0 pg    MCHC 33.6 31.5 - 36.5 g/dL    RDW 13.9 10.0 - 15.0 %    Platelet Count 246 150 - 450 10e3/uL    % Neutrophils 49 %    % Lymphocytes 35 %    % Monocytes 12 %    % Eosinophils 2 %    % Basophils 2 %    % Immature Granulocytes 0 %    NRBCs per 100 WBC 0 <1 /100    Absolute Neutrophils 4.1 1.6 - 8.3 10e3/uL    Absolute Lymphocytes 2.8 0.8 - 5.3 10e3/uL    Absolute Monocytes 1.0 0.0 - 1.3 10e3/uL    Absolute Eosinophils 0.1 0.0 - 0.7 10e3/uL    Absolute Basophils 0.1 0.0 - 0.2 10e3/uL    Absolute Immature Granulocytes 0.0 <=0.4 10e3/uL    Absolute NRBCs 0.0 10e3/uL   Extra Blue Top Tube     Status: None    Collection Time: 06/21/23  8:00 PM   Result Value Ref Range    Hold Specimen JIC    Extra Red Top Tube     Status: None    Collection Time: 06/21/23  8:00 PM   Result Value Ref Range    Hold Specimen JI    Asymptomatic COVID-19 Virus (Coronavirus) by PCR Nose     Status: Normal    Collection Time: 06/21/23  8:04 PM    Specimen: Nose; Swab   Result Value Ref Range    SARS CoV2 PCR Negative Negative    Narrative    Testing was performed using the Xpert Xpress SARS-CoV-2 Assay on the Cepheid Gene-Xpert Instrument Systems. Additional information about this Emergency Use Authorization (EUA) assay can be found via the Lab Guide. This test should be ordered for the detection of SARS-CoV-2 in individuals who meet SARS-CoV-2  clinical and/or epidemiological criteria as well as from individuals without symptoms or other reasons to suspect COVID-19. Test performance for asymptomatic patients has only been established in anterior nasal swab specimens. This test is for in vitro diagnostic use under the FDA EUA for laboratories certified under CLIA to perform high complexity testing. This test has not been FDA cleared or approved. A negative result does not rule out the presence of PCR inhibitors in the specimen or target RNA concentration below the limit of detection for the assay. The possibility of a false negative should be considered if the patient's recent exposure or clinical presentation suggests COVID-19. This test was validated by [facility] These Laboratories are certified under the Clinical Laboratory Improvement Amendments (CLIA) as qualified to perform high complexity testing.     Glucose by meter     Status: Abnormal    Collection Time: 06/21/23  8:57 PM   Result Value Ref Range    GLUCOSE BY METER POCT 109 (H) 70 - 99 mg/dL   Urine Drugs of Abuse Screen     Status: Abnormal    Collection Time: 06/21/23 11:03 PM    Narrative    The following orders were created for panel order Urine Drugs of Abuse Screen.  Procedure                               Abnormality         Status                     ---------                               -----------         ------                     Drug abuse screen 1 urin...[[id number]]  Abnormal            Final result                 Please view results for these tests on the individual orders.   Drug abuse screen 1 urine (ED)     Status: Abnormal    Collection Time: 06/21/23 11:03 PM   Result Value Ref Range    Amphetamines Urine Screen Negative Screen Negative    Barbituates Urine Screen Negative Screen Negative    Benzodiazepine Urine Screen Negative Screen Negative    Cannabinoids Urine Screen Positive (A) Screen Negative    Cocaine Urine Screen Negative Screen Negative     Opiates Urine Screen Negative Screen Negative   Diagnostic Evaluation Center (DEC) Assessment Consult Order:     Status: None ()    Collection Time: 06/22/23  1:03 PM    Alma GordilloGreggkhurram LOZANO     6/22/2023  1:28 PM  6/21/2023  Billy Calzada 1960     Writer consulted with ED provider, Jim MESSINA,  on this date at 1:28   PM. It was determined that pt would not benefit from assessment   at this time due to Pt has already had an initial DEC assessment,   ED has been referred to Extended Care for ongoing support while   in the ED.    ED will call DEC at [phone] when pt is ready and able to   participate in assessment.     OR DEC order has been closed at this time.    Hallie Olivaresuderanastasiia Gordillo         ED MEDICATIONS:   Medications   cloNIDine (CATAPRES) tablet 0.1 mg (0.1 mg Oral $Given 6/22/23 0833)   OLANZapine zydis (zyPREXA) ODT tab 5-10 mg (has no administration in time range)   flumazenil (ROMAZICON) injection 0.2 mg (has no administration in time range)   melatonin tablet 5 mg (has no administration in time range)   diazepam (VALIUM) tablet 10 mg (10 mg Oral $Given 6/22/23 0114)     Or   diazepam (VALIUM) injection 5-10 mg ( Intravenous See Alternative 6/22/23 0114)   thiamine (B-1) tablet 100 mg (100 mg Oral $Given 6/22/23 0834)   folic acid (FOLVITE) tablet 1 mg (1 mg Oral $Given 6/22/23 0833)   multivitamin w/minerals (THERA-VIT-M) tablet 1 tablet (1 tablet Oral $Given 6/22/23 0834)   0.9% sodium chloride BOLUS (0 mLs Intravenous Stopped 6/21/23 2238)   OLANZapine (zyPREXA) injection 10 mg (10 mg Intramuscular $Given 6/22/23 0535)         Impression:    ICD-10-CM    1. Alcohol intoxication (H)  F10.929           Plan:    Pending studies include sobered and re-evaluated by DEC, ok to br discharged, no SI.        Sahil Treadwell MD, Sahil Rushing MD  06/22/23 4119

## 2023-06-22 NOTE — PROGRESS NOTES
Triage & Transition Services, Extended Care     Billy Calzada  June 22, 2023    Billy is followed related to Observation status. Please see initial DEC Crisis Assessment completed for complete assessment information. Medical record is reviewed. While patient is in the ED, care team is working towards Learn and Demonstrate at Least One Skill Focused on Crisis Stabilization.     09:42 - 09:45 AMWriter did attempt to meet with willie, and while karolinanet appeared to be awake, he kept his eyes closed and did not verbally respond to writer. Writer will attempt to meet with willie for a reassessment later today.    Plan:  Observation: Patient was unable to cooperate with reassessment at this time.    Extended Care will follow and meet with patient/family/care team as able or requested.     Jim Magaña  [facility], Extended Care   [phone]

## 2023-06-22 NOTE — ED NOTES
The patient acutely became agitated, demanding medications for withdrawal.  No clear visible evidence of withdrawal, and his nurse said that he last scored amount of 3 on the emesis a scale.  He was explained that we treat withdrawal based on objective findings such as vital signs, etc.  He was told that when he displays evidence of withdrawal that he will be treated appropriately for withdrawal.  He is demanding Valium.  He is now demanding to leave.  Again, the mental health  did not feel he was safe to discharge as she did not feel he had a safe disposition as he could not confidently state that he was not suicidal.  His nurse reports that he did also tell him that he was suicidal, could not keep himself safe.  Code 21 was called.  He is on a health officer hold.  Given the ongoing agitation, Zyprexa IM was ordered for chemical restraint.    Code care time-30 minutes    Dictation Disclaimer: Some of this Note has been completed with voice-recognition dictation software. Although errors are generally corrected real-time, there is the potential for a rare error to be present in the completed chart.       Obdulia Linares MD  06/22/23 2015

## 2023-06-22 NOTE — ED NOTES
The patient was accepted at shift change sign out pending DEC assessment.     Per DEC , he was inconsistent about his report of SI. He initially reported SI to DEC, but then denied it. He refused to allow collateral. He declined any sort of resources. The  doesn't feel we have a good safety plan at this time, so is recommending further assessment in the morning by extended care.     He will be signed out at shift change with that plan.      Obdulia Linares MD  06/22/23 0408

## 2023-06-22 NOTE — ED PROVIDER NOTES
"ED Provider Note  [facility]      History     Chief Complaint   Patient presents with     Alcohol Intoxication     HPI  Billy Calzada is a 63 year old male who presents for alcohol intoxication. Per EMS, patient was found lying on ground outside intoxicated. Refused VS, BG, and IV per EMS. He appears very intoxicated on my assessment with slurred speech. He admits to drinking alcohol \"whatever I can get my hands on\" daily, uncertain of amount, states \"as much as I can\". Reports he may drink other types of alcohol than just what is found at the liquor store including mouthwash but denies drinking rubbing alcohol. Reports he also smokes other substances, uncertain which ones. Denies IV drug use. Reports last drink was 2 days ago and is asking for Valium. Does not answer when asked about seizure history, none noted in chart review. Reports feeling suicidal, cannot tell me if he's attempted suicide or would attempt suicide but will tell me he feels safe here and won't try to harm himself while in the Emergency Department. He denies any injuries or any pain anywhere. Denies any recent trauma.         Past Medical History  Past Medical History:   Diagnosis Date     Alcohol abuse      Anxiety      Hyperlipidemia      Hypertension      History reviewed. No pertinent surgical history.  atenolol (TENORMIN) 100 MG tablet  atenolol (TENORMIN) 50 MG tablet  lorazepam (ATIVAN) 2 MG tablet  Multiple Vitamins-Minerals (MULTIVITAL PO)  simvastatin (ZOCOR) 20 MG tablet  zolpidem (AMBIEN) 10 MG tablet      Allergies   Allergen Reactions     Iodine I 131 Tositumomab      Keflex [Cephalexin]      Family History  Family History   Problem Relation Age of Onset     Tremor Son      Tremor Son      Social History   Social History     Tobacco Use     Smoking status: Never     Tobacco comments:     KEVEN   Substance Use Topics     Alcohol use: Yes     Drug use: No         A medically appropriate review of systems " was performed with pertinent positives and negatives noted in the HPI, and all other systems negative.    Physical Exam   BP: 96/63  Pulse: 65  Resp: 26  SpO2: 96 %  Physical Exam  Constitutional:       Appearance: He is ill-appearing.      Comments: Intoxicated appearing, slurring words   HENT:      Head: Normocephalic and atraumatic.   Cardiovascular:      Rate and Rhythm: Normal rate and regular rhythm.   Pulmonary:      Breath sounds: Normal breath sounds.   Abdominal:      General: Abdomen is flat. There is no distension.      Palpations: Abdomen is soft.      Tenderness: There is no abdominal tenderness. There is no guarding.   Musculoskeletal:         General: Normal range of motion.   Skin:     General: Skin is warm and dry.      Capillary Refill: Capillary refill takes less than 2 seconds.      Findings: No bruising.   Neurological:      Mental Status: He is disoriented.         ED Course, Procedures, & Data      Procedures        Results for orders placed or performed during the hospital encounter of 06/21/23   Comprehensive metabolic panel     Status: Abnormal   Result Value Ref Range    Sodium 139 136 - 145 mmol/L    Potassium 4.2 3.4 - 5.3 mmol/L    Chloride 105 98 - 107 mmol/L    Carbon Dioxide (CO2) 19 (L) 22 - 29 mmol/L    Anion Gap 15 7 - 15 mmol/L    Urea Nitrogen 12.4 8.0 - 23.0 mg/dL    Creatinine 1.20 (H) 0.67 - 1.17 mg/dL    Calcium 9.2 8.8 - 10.2 mg/dL    Glucose 104 (H) 70 - 99 mg/dL    Alkaline Phosphatase 70 40 - 129 U/L    AST 31 0 - 45 U/L    ALT 33 0 - 70 U/L    Protein Total 7.4 6.4 - 8.3 g/dL    Albumin 4.4 3.5 - 5.2 g/dL    Bilirubin Total 0.6 <=1.2 mg/dL    GFR Estimate 69 >60 mL/min/1.73m2   Asymptomatic COVID-19 Virus (Coronavirus) by PCR Nose     Status: Normal    Specimen: Nose; Swab   Result Value Ref Range    SARS CoV2 PCR Negative Negative    Narrative    Testing was performed using the Xpert Xpress SARS-CoV-2 Assay on the Cepheid Gene-Xpert Instrument Systems. Additional  information about this Emergency Use Authorization (EUA) assay can be found via the Lab Guide. This test should be ordered for the detection of SARS-CoV-2 in individuals who meet SARS-CoV-2 clinical and/or epidemiological criteria as well as from individuals without symptoms or other reasons to suspect COVID-19. Test performance for asymptomatic patients has only been established in anterior nasal swab specimens. This test is for in vitro diagnostic use under the FDA EUA for laboratories certified under CLIA to perform high complexity testing. This test has not been FDA cleared or approved. A negative result does not rule out the presence of PCR inhibitors in the specimen or target RNA concentration below the limit of detection for the assay. The possibility of a false negative should be considered if the patient's recent exposure or clinical presentation suggests COVID-19. This test was validated by [facility]. These Laboratories are certified under the Clinical Laboratory Improvement Amendments (CLIA) as qualified to perform high complexity testing.     Ethyl Alcohol Level     Status: Abnormal   Result Value Ref Range    Alcohol ethyl 0.39 (HH) <=0.01 g/dL   [place] Draw     Status: None (In process)    Narrative    The following orders were created for panel order [place] Draw.  Procedure                               Abnormality         Status                     ---------                               -----------         ------                     Extra Blue Top Tube[[id number]]                              Final result               Extra Red Top Tube[[id number]]                               Final result               Extra Green Top (Lithium...[[id number]]                                                 Extra Purple Top Tube[[id number]]                                                         Please view results for these tests on the individual orders.   CBC with platelets and differential      Status: Abnormal   Result Value Ref Range    WBC Count 8.1 4.0 - 11.0 10e3/uL    RBC Count 4.21 (L) 4.40 - 5.90 10e6/uL    Hemoglobin 14.0 13.3 - 17.7 g/dL    Hematocrit 41.7 40.0 - 53.0 %    MCV 99 78 - 100 fL    MCH 33.3 (H) 26.5 - 33.0 pg    MCHC 33.6 31.5 - 36.5 g/dL    RDW 13.9 10.0 - 15.0 %    Platelet Count 246 150 - 450 10e3/uL    % Neutrophils 49 %    % Lymphocytes 35 %    % Monocytes 12 %    % Eosinophils 2 %    % Basophils 2 %    % Immature Granulocytes 0 %    NRBCs per 100 WBC 0 <1 /100    Absolute Neutrophils 4.1 1.6 - 8.3 10e3/uL    Absolute Lymphocytes 2.8 0.8 - 5.3 10e3/uL    Absolute Monocytes 1.0 0.0 - 1.3 10e3/uL    Absolute Eosinophils 0.1 0.0 - 0.7 10e3/uL    Absolute Basophils 0.1 0.0 - 0.2 10e3/uL    Absolute Immature Granulocytes 0.0 <=0.4 10e3/uL    Absolute NRBCs 0.0 10e3/uL   Extra Blue Top Tube     Status: None   Result Value Ref Range    Hold Specimen JIC    Extra Red Top Tube     Status: None   Result Value Ref Range    Hold Specimen JIC    Glucose by meter     Status: Abnormal   Result Value Ref Range    GLUCOSE BY METER POCT 109 (H) 70 - 99 mg/dL   CBC with platelets differential     Status: Abnormal    Narrative    The following orders were created for panel order CBC with platelets differential.  Procedure                               Abnormality         Status                     ---------                               -----------         ------                     CBC with platelets and d...[[id number]]  Abnormal            Final result                 Please view results for these tests on the individual orders.     Medications   folic acid (FOLVITE) tablet 1 mg (1 mg Oral $Given 6/21/23 2032)   thiamine (B-1) tablet 100 mg (100 mg Oral $Given 6/21/23 2032)   0.9% sodium chloride BOLUS (1,000 mLs Intravenous $New Bag 6/21/23 2022)     Labs Ordered and Resulted from Time of ED Arrival to Time of ED Departure   COMPREHENSIVE METABOLIC PANEL - Abnormal       Result Value    Sodium  139      Potassium 4.2      Chloride 105      Carbon Dioxide (CO2) 19 (*)     Anion Gap 15      Urea Nitrogen 12.4      Creatinine 1.20 (*)     Calcium 9.2      Glucose 104 (*)     Alkaline Phosphatase 70      AST 31      ALT 33      Protein Total 7.4      Albumin 4.4      Bilirubin Total 0.6      GFR Estimate 69     ETHYL ALCOHOL LEVEL - Abnormal    Alcohol ethyl 0.39 (*)    CBC WITH PLATELETS AND DIFFERENTIAL - Abnormal    WBC Count 8.1      RBC Count 4.21 (*)     Hemoglobin 14.0      Hematocrit 41.7      MCV 99      MCH 33.3 (*)     MCHC 33.6      RDW 13.9      Platelet Count 246      % Neutrophils 49      % Lymphocytes 35      % Monocytes 12      % Eosinophils 2      % Basophils 2      % Immature Granulocytes 0      NRBCs per 100 WBC 0      Absolute Neutrophils 4.1      Absolute Lymphocytes 2.8      Absolute Monocytes 1.0      Absolute Eosinophils 0.1      Absolute Basophils 0.1      Absolute Immature Granulocytes 0.0      Absolute NRBCs 0.0     GLUCOSE BY METER - Abnormal    GLUCOSE BY METER POCT 109 (*)    COVID-19 VIRUS (CORONAVIRUS) BY PCR - Normal    SARS CoV2 PCR Negative     GLUCOSE MONITOR NURSING POCT     No orders to display          Critical care was not performed.     Medical Decision Making  The patient's presentation was of high complexity (an acute health issue posing potential threat to life or bodily function).    The patient's evaluation involved:  an assessment requiring an independent historian (see separate area of note for details)  review of external note(s) from 3+ sources (see separate area of note for details)  ordering and/or review of 3+ test(s) in this encounter (see separate area of note for details)  review of 3+ test result(s) ordered prior to this encounter (see separate area of note for details)    The patient's management necessitated moderate risk (prescription drug management including medications given in the ED) and high risk (a decision regarding  hospitalization).      Assessment & Plan    Billy Calzada is a 63 year old male who presents for alcohol intoxication. Per EMS, patient was found lying on ground outside intoxicated. Will obtain comprehensive labs, Utox, alcohol level. Unlikely he is currently withdrawing as appears clinically intoxicated with slurred speech. Will give IV fluids and vitamins.     DEC assessment once more sober and able to engage in assessment.     Detox bed on hold pending medical clearance.     I reviewed the labs which were significant for BAL 0.39. unlikely patient is currently in alcohol withdrawal or needing valium. Other labs remarkable for no leukocytosis, no anemia, no electrolyte abnormalities. creatinine 1.20 likely related to dehydration. Given 1L IVF.     At the conclusion of my shift, patient was still very intoxicated, no signs of active alcohol withdrawal. Patient will continue to be closely monitored and a DEC assessment will be completed when patient is able to meaningfully participate. Discussed with ED attending physician Dr Case Monet who assumed ongoing care at the conclusion of my shift.     I have reviewed the nursing notes. I have reviewed the findings, diagnosis, plan  with the patient.    New Prescriptions    No medications on file       Final diagnoses:   Alcohol intoxication (H)       SEBASTIÁN Olmos CNP  [facility] EMERGENCY DEPARTMENT  6/21/2023     Maddie Mendoza APRN CNP  06/22/23 [id number]

## 2023-06-22 NOTE — PLAN OF CARE
Billy Calzada  June 22, 2023  Plan of Care Hand-off Note     Patient Care Path: Observation    Plan for Care:     A lower level of care has been unsuccessful in treating and stabilizing patient s mental health symptoms because of pt inconsistent report regarding suicide ideation and poor safety planning. However, with brief observation, monitored therapeutic treatment, and intervention of mental health symptoms in the ED, symptoms may be mitigated with potential for disposition to a less restrictive level of care than an inpatient setting. Patient is not currently on the inpatient worklist. Next steps in care include provider would like pt to be reassessed for suicide ideation after given more time to regained sobriety. Extended Care will follow, working to address inconsistent suicide ideation report in initial assessment and pt poor safety planning/refusal for interventions.    Critical Safety Issues: pt hx of alcoholism and reporting SI while intoxicated    Overview:  This patient is a child/adolescent: No    This patient has additional special visitor precautions: No    Legal Status: Voluntary    Contacts:   denies    Psychiatry Consult:  Psychiatry Consult not requested because did not consent    Updated RN and Attending Provider regarding plan of care.    Kavya Ennis, LPCC, LADC

## 2023-06-22 NOTE — CONSULTS
"Diagnostic Evaluation Consultation  Crisis Assessment    Patient was assessed: Jayla  Patient location: declocations: [facility] Emergency Department  Was a release of information signed: No. Reason: did not witness      Referral Data and Chief Complaint  Billy is a 63 year old, who uses he/him pronouns, and presents to the ED via EMS. Patient is referred to the ED by other: EMS. Patient is presenting to the ED for the following concerns: Pt presents for alcohol intoxication and suicide ideation.      Informed Consent and Assessment Methods     Patient is his own guardian. Writer met with patient and explained the crisis assessment process, including applicable information disclosures and limits to confidentiality, assessed understanding of the process, and obtained consent to proceed with the assessment. Patient was observed to be able to participate in the assessment as evidenced by alert and oriented. Assessment methods included conducting a formal interview with patient, review of medical records, collaboration with medical staff, and obtaining relevant collateral information from family and community providers when available..     Over the course of this crisis assessment provided reassurance, offered validation, engaged patient in problem solving and disposition planning, worked with patient on safety and aftercare planning and provided psychoeducation. Patient's response to interventions was pt was no open to any interventions.     Summary of Patient Situation     Pt presents for alcohol intoxication and suicide ideation. Pt reports he does not recall being brought in by EMS. Pt reports he is experiencing suicide ideation, \"I don't care about myself.\" Pt reports he does not know why he doesn't care about himself. Pt reports he has a plan to kill himself and when asked if he has a plan, \"I don't have one. I have ketamine treatment.\" Per chart review, pt has been in ketamine treatment for over a year, but " "pt states this is his first time. When asked about this, pt states, \"I'm in the process of getting back into it.\" Pt denies having access to weapons or guns or drugs at home he can use to harm himself. Pt denies current hallucinations or delusions. Pt denies intent to kill himself. Pt reports history of suicide ideation and one suicide attempt 20 years ago via overdose on medication. When pt was asked to describe the nature of his suicide ideation, he states, \"I am not suicidal.\" Pt was asked what changed, pt states, \"I don't know.\" Pt refused to be scheduled for any outpatient services or chemical health assessment, \"been there, done that.\" Pt reports his safety plan is to use his primary doctor and continue with his medication. Pt presents as alert, oriented, minimally responsive, guarded, irritable, and inconsistent in his assessment. Pt appears to be functioning at his baseline, due to significant ED visits for alcohol intoxication and history of suicide ideation when intoxicated.     Brief Psychosocial History     Pt reports he lives on his own and is retired. Pt denies education involvement, legal issues, spiritual/cultural concerns, and  status. Pt reports the following hobbies: \"hiking, going for walks\". Pt reports having no supports and reports he does not want us to contact his brother, who is listed as his emergency contact. Pt reports his brother is no longer supportive to him.     Significant Clinical History     Pt reports history of anxiety, depression, and alcohol abuse. Pt denies history of trauma. Pt refused to disclose chemical health treatment history. Pt denies history of inpatient mental health treatment. Per chart review, pt has a history of detox, chemical health treatment and significant ED visits for alcohol intoxication, with history of suicide ideation when intoxicated.      Collateral Information  Pt refused consent for contacting his brother. Pt reports he is on his own and has " no supports.      Risk Assessment  [place] Suicide Severity Rating Scale Full Clinical Version: 6/22/2023  Suicidal Ideation  1. Wish to be Dead (Lifetime): Yes  1. Wish to be Dead (Past 1 Month): Yes  2. Non-Specific Active Suicidal Thoughts (Lifetime): No  Intensity of Ideation  Most Severe Ideation Rating (Lifetime): 1  Most Severe Ideation Rating (Past 1 Month): 1  Frequency (Lifetime): Less than once a week  Frequency (Past 1 Month): Less than once a week  Duration (Lifetime): Fleeting, few seconds or minutes  Duration (Past 1 Month): Fleeting, few seconds or minutes  Controllability (Lifetime): Can control thoughts with little difficulty  Controllability (Past 1 Month): Can control thoughts with little difficulty  Deterrents (Lifetime): Deterrents definitely stopped you from attempting suicide  Deterrents (Past 1 Month): Deterrents definitely stopped you from attempting suicide  Reasons for Ideation (Lifetime): Completely to get attention, revenge, or a reaction from others  Reasons for Ideation (Past 1 Month): Completely to get attention, revenge, or a reaction from others  Suicidal Behavior  Actual Attempt (Lifetime): No  Has subject engaged in non-suicidal self-injurious behavior? (Lifetime): No  Interrupted Attempts (Lifetime): No  Aborted or Self-Interrupted Attempt (Lifetime): No  Preparatory Acts or Behavior (Lifetime): No  C-SSRS Risk (Lifetime/Recent)  Calculated C-SSRS Risk Score (Lifetime/Recent): Low Risk    [place] Suicide Severity Rating Scale Since Last Contact:        Validity of evaluation is impacted by presenting factors during interview.   Comments regarding subjective versus objective responses to [place] tool: pt may still be intoxicated. Last LAUREN was on 6/21/2023 at 8:54PM. Time of assessment was 6/22/2023 at 3:41AM  Environmental or Psychosocial Events: challenging interpersonal relationships, geographic isolation from supports, impulsivity/recklessness, excessive debt, poor finances  and ongoing abuse of substances  Chronic Risk Factors: history of psychiatric hospitalization and history of attachment issues   Warning Signs: seeking access to means to hurt or kill self, talking or writing about death, dying, or suicide, hopelessness, pain (new or worsening), rage, anger, seeking revenge, acting reckless or engaging in risky activities, feeling trapped, like there is no way out, increasing substance use or abuse, withdrawing from friends, family, and society, anxiety, agitation, unable to sleep, sleeping all the time, dramatic changes in mood, no reason for living, no sense of purpose in life and engaging in self-destructive behavior  Protective Factors: lives in a responsibly safe and stable environment and help seeking  Interpretation of Risk Scoring, Risk Mitigation Interventions and Safety Plan:  Pt risk indicated as low. Pt denies SI/SIB/SA/HI and denies plans, means, or intent to harm himself or others. Pt denies current hallucinations or delusions. Earlier in assessment pt did admit to suicide ideation. Per chart review, pt presents with SI while intoxicated and, once sober, denies SI.     Does the patient have thoughts of harming others? No     Is the patient engaging in sexually inappropriate behavior?  no        Current Substance Abuse     Is there recent substance abuse? reports daily alcohol and marijuana. reports sporadic use, last used alcohol yesterday and marijuana in the past week     Was a urine drug screen or blood alcohol level obtained: Yes positive for cannabis, LAUREN .39 @ 8:54PM on 6/21/2023       Mental Status Exam     Affect: Flat   Appearance: Appropriate    Attention Span/Concentration: Attentive  Eye Contact: Avoidant   Fund of Knowledge: Delayed    Language /Speech Content: Fluent   Language /Speech Volume: Normal    Language /Speech Rate/Productions: Minimally Responsive    Recent Memory: Variable   Remote Memory: Intact   Mood: Irritable    Orientation to Person: Yes     Orientation to Place: Yes   Orientation to Time of Day: Yes    Orientation to Date: Yes    Situation (Do they understand why they are here?): Yes    Psychomotor Behavior: Normal    Thought Content: Suicidal   Thought Form: Intact      History of commitment: No       Medication    Psychotropic medications: Yes. Pt is currently taking see HPI. Medication compliant: Yes. Recent medication changes: No  Medication changes made in the last two weeks: No       Current Care Team    Primary Care Provider: Yes, refused to share name  Psychiatrist: No  Therapist: No  : No     CTSS or ARMHS: No  ACT Team: No  Other: No      Diagnosis    Major depressive disorder, Recurrent episode, Moderate - (F33.1)  Alcohol use disorder, Severe - (F10.20)  Cannabis use disorder, Moderate - (F12.20)    Clinical Summary and Substantiation of Recommendations    A lower level of care has been unsuccessful in treating and stabilizing patient s mental health symptoms because of pt inconsistent report regarding suicide ideation and poor safety planning. However, with brief observation, monitored therapeutic treatment, and intervention of mental health symptoms in the ED, symptoms may be mitigated with potential for disposition to a less restrictive level of care than an inpatient setting. Patient is not currently on the inpatient worklist. Next steps in care include provider would like pt to be reassessed for suicide ideation after given more time to regained sobriety. Extended Care will follow, working to address inconsistent suicide ideation report in initial assessment and pt poor safety planning/refusal for interventions.    Disposition    Recommended disposition: Other: Observation       Reviewed case and recommendations with attending provider. Attending Name: Dr. Cruz       Attending concurs with disposition: Yes       Patient and/or validated legal guardian concurs with disposition: No: pt refuses interventions       Final  disposition: Other: Observation.       Assessment Details    Patient interview started at: 3:41AM and completed at: 3:59AM.     Total time spent with the patient or their family: .25 hrs      CPT code(s) utilized: 76970 - Psychotherapy for Crisis - 60 (30-74*) min       Kavya Ennis, MEI, DAQUAN, MS, MEI, DAQUAN, Psychotherapist  DEC - Triage & Transition Services  Callback: [phone]

## 2023-06-22 NOTE — PROGRESS NOTES
[facility] Crisis Reassessment      Billy Calzada was reassessed at the request of ED for the following reasons: Observation Status. Pt was first seen on 6/22/23 by Kavya Ennis; see the initial assessment note for details.      Patient Presentation    Initial ED presentation details: Patient was brought to the ED following ETOH use, and making suicidal comments.  Patient was guarded and irritable, and had limited engagement in initial assessment.    Current patient presentation: Patient is pleasant and cooperative with writer. He does endorse some shame with his recent statements.  Patient is future oriented and denies ongoing depression.  He is able to engage in safety planning however continues to not want any additional services.  Patient is alert and oriented, and denies ongoing concerns.    Changes observed since initial assessment: Patient is no longer intoxicated, and is future oriented.  Patient does have a supportive living situation where he plans to return.      Risk of Harm  [place] Suicide Severity Rating Scale Full Clinical Version:6/22/23  Suicidal Ideation  1. Wish to be Dead (Lifetime): Yes  1. Wish to be Dead (Past 1 Month): Yes  2. Non-Specific Active Suicidal Thoughts (Lifetime): No  Intensity of Ideation  Most Severe Ideation Rating (Lifetime): 1  Most Severe Ideation Rating (Past 1 Month): 1  Frequency (Lifetime): Less than once a week  Frequency (Past 1 Month): Less than once a week  Duration (Lifetime): Fleeting, few seconds or minutes  Duration (Past 1 Month): Fleeting, few seconds or minutes  Controllability (Lifetime): Can control thoughts with little difficulty  Controllability (Past 1 Month): Can control thoughts with little difficulty  Deterrents (Lifetime): Deterrents definitely stopped you from attempting suicide  Deterrents (Past 1 Month): Deterrents definitely stopped you from attempting suicide  Reasons for Ideation (Lifetime): Completely to get attention, revenge, or a reaction  from others  Reasons for Ideation (Past 1 Month): Completely to get attention, revenge, or a reaction from others  Suicidal Behavior  Actual Attempt (Lifetime): No  Has subject engaged in non-suicidal self-injurious behavior? (Lifetime): No  Interrupted Attempts (Lifetime): No  Aborted or Self-Interrupted Attempt (Lifetime): No  Preparatory Acts or Behavior (Lifetime): No  C-SSRS Risk (Lifetime/Recent)  Calculated C-SSRS Risk Score (Lifetime/Recent): Low Risk    [place] Suicide Severity Rating Scale Since Last Contact: 6/22/23  Suicidal Ideation (Since Last Contact)  1. Wish to be Dead (Since Last Contact): No  2. Non-Specific Active Suicidal Thoughts (Since Last Contact): No  Suicidal Behavior (Since Last Contact)  Actual Attempt (Since Last Contact): No  Interrupted Attempts (Since Last Contact): No  Aborted or Self-Interrupted Attempt (Since Last Contact): No  Preparatory Acts or Behavior (Since Last Contact): No     C-SSRS Risk (Since Last Contact)  Calculated C-SSRS Risk Score (Since Last Contact): No Risk Indicated    Validity of evaluation is not impacted by presenting factors during interview.  Environmental or Psychosocial Events: impulsivity/recklessness and ongoing abuse of substances  Chronic Risk Factors: other: Loss of a brother to suicide   Warning Signs: talking or writing about death, dying, or suicide  Protective Factors: lives in a responsibly safe and stable environment, sense of importance of health and wellness and cultural, spiritual , or Pentecostal beliefs associated with meaning and value in life  Interpretation of Risk Scoring, Risk Mitigation Interventions and Safety Plan:  There is no suicide risk indicated during this intervention.  Patient was engaged in safety planning.      Does the patient have thoughts of harming others? No    Mental Status Exam   Affect: Appropriate   Appearance: Appropriate    Attention Span/Concentration: Attentive?    Eye Contact: Engaged   Fund of Knowledge:  "Appropriate    Language /Speech Content: Fluent   Language /Speech Volume: Normal    Language /Speech Rate/Productions: Normal    Recent Memory: Intact   Remote Memory: Intact   Mood: Normal    Orientation to Person: Yes    Orientation to Place: Yes   Orientation to Time of Day: Yes    Orientation to Date: Yes    Situation (Do they understand why they are here?): Yes    Psychomotor Behavior: Normal    Thought Content: Clear   Thought Form: Goal Directed       Additional Collateral Information   Patient declines to have writer contact any collateral information, the patient does live in [facility], with [facility].    Therapeutic Intervention  The following therapeutic methodologies were employed when working with the patient: Establishing rapport, Active listening and Safety planning. Patient response to intervention: engaged.    Diagnosis:   Major depressive disorder, Recurrent episode, Moderate - (F33.1)  Alcohol use disorder, Severe - (F10.20)  Cannabis use disorder, Moderate - (F12.20)    Clinical Substantiation of Recommendations  Patient is currently sober, and denying all ongoing mental health symptoms.  Patient reports that sometimes when he drinks too much he does make statements he later regrets.  He endorses having some suicidal thoughts about \"once or twice a year\" but states no plan to act on these thoughts, and reports that they are quite manageable.  Patient currently lives at [facility] through [facility], which has nursing staff, and counselors onsite.  Patient reports that he will reach out to them if he feels that he needs to, but denies any other services.    Plan:    Disposition  Recommended disposition: Rule 25/NIRANJAN Assessment      Reviewed case and recommendations with attending provider. Attending Name: Dr Treadwell     Attending concurs with disposition: Yes      Patient and/or verified legal guardian concurs with disposition: No: Patient wants to discharge, but " declines all additional services.      Final disposition: Other: Return to [facility].         Assessment Details  Total duration spent on the patient case in minutes: .50 hrs     CPT code(s) utilized: 63501 - Psychotherapy (with patient) - 30 (16-37*) min       Jim Magaña [facility]  Callback: [phone]      Aftercare Plan  If I am feeling unsafe or I am in a crisis, I will:   Contact my established care providers   Call the [place] Suicide Prevention Lifeline: [phone]   Go to the nearest emergency room   Call [phone]     Warning signs that I or other people might notice when a crisis is developing for me:     I am having increasing suicidal thoughts that turn to plans with intent or means  I am having additional urges to self-harm    My emotions are of hopelessness; feeling like there's no way out.  Rage or anger.  Engaging in risky activities without thinking  Withdrawing from family/friends  Dramatic mood swings  Drastic personality changes   Use of alcohol or drugs  Postings on social media  Neglect of personal hygiene or cares     Things I am able to do on my own to cope or help me feel better:    Spending quality time with loved ones  Staying hydrated  Eating balanced meals  Going for a walk every day  Take care of daily responsibilities/needs  Focus on positive self-talk vs negative self-talk    Things that I am able to do with others to cope or help me better:   Exercise  Music  Deep breathing  Meditations  Journal  Self-regulate  Self check-in  Ask for help    Things I can use or do for distraction:   Reach out to/spend time with family, friends  Shower  Exercise  Chores or do a project  Listen to music  Watch movie/TV  Listening to music  Journaling  Reading a book  Meditating  Call a friend    Changes I can make to support my mental health and wellness:    -I will abstain from all mood altering chemicals not currently prescribed to me   -I will attend scheduled mental health therapy and  psychiatric appointments and follow all   recommendations  -I will commit to 30 minutes of self care daily - this can be as simple as taking a shower, going for a   walk, cooking a meal, read, writing, etc  -I will practice square breathing when I begin to feel anxious - in breath through the nose for the count   of 4 and the first line on the square. Out breath through the mouth for the count of 4 for the second line   of the square. Repeat to complete the square. Repeat the square as many times as needed.  - I will use distraction skills of: going for walks, watching TV, spending time outside, calling a friend or   family member  -Use community resources, including [employer] numbers, [facility] crisis and support meetings  -Maintain a daily schedule/routine  -Practice deep breathing skills  -Download a meditation oralia and spend 15-20 minutes per day mediating/relaxing. Some apps to   download include: Calm, Headspace and Insight Timer. All 3 of these apps have free version    Reduce Extreme Emotion  QUICKLY:  Changing Your Body Chemistry      T:  Change your body Temperature to change your autonomic nervous system     Use Ice Water to calm yourself down FAST     Put your face in a bowl of ice water (this is the best way; have the person keep his/her face in ice water for 30-45 seconds - initial research is showing that the longer s/he can hold her/his face in the water, the better the response), or     Splash ice water on your face, or hold an ice pack on your face      I:  Intensely exercise to calm down a body revved up by emotion     Examples: running, walking fast, jumping, playing basketball, weight lifting, swimming, calisthenics, etc.     Engage in exercises that DO NOT include violent behaviors. Exercises that utilize violent behaviors tend to function as  behavioral rehearsal,  and rather than calming the person down, may actually  rev  the person up more, increasing the likelihood of violence, and lessening the  likelihood that they will  burn off  energy     P:  Progressively relax your muscles     Starting with your hands, moving to your forearms, upper arms, shoulders, neck, forehead, eyes, cheeks and lips, tongue and teeth, chest, upper back, stomach, buttocks, thighs, calves, ankles, feet     Tense (10 seconds,   of the way), then relax each muscle (all the way)     Notice the tension     Notice the difference when relaxed (by tensing first, and then relaxing, you are able to get a more thorough relaxation than by simply relaxing)      P: Paced breathing to relax     The standard technique is to begin with counting the number of steps one takes for a typical inhale, then counting the steps one takes for a typical exhale, and then lengthening the amount of steps for the exhalation by one or two steps.  OR    Repeat this pattern for 1-2 minutes    Inhale for four (4) seconds     Exhale for six (6) to eight (8) seconds     Research demonstrated that one can change one's overall level of anxiety by doing this exercise for even a few minutes per day      People in my life that I can ask for help:   Family  Friends  Providers   Staff at [facility]    Your [facility] has a mental health crisis team you can call 24/7:   [facility] Crisis Line Number: [phone]  [facility] Mental Health Crisis: [phone] - Call the crisis line for immediate mental health support, 24 hours a day.   [facility] Crisis Line Number: [phone]  [facility] Crisis Line Number: [phone]  [facility] Crisis Line Number: [phone]   [facility] Crisis Line Number: [phone]  [place]: [phone]  [place]: [phone]  [facility] Crisis Number: [phone]  [facility] Crisis: [phone]      Other things that are important when I'm in crisis:   Ask for help    Additional resources and information:     Mental Health Apps  My3  [email or web address]    [employer]   "[email or web address]     Alternate-Nostril Breathing  Alternate-nostril breathing (maggie veronicaarmando) involves blocking off one nostril at a time as you breathe through the other, alternating between nostrils in a regular pattern.2 It's best to practice this type of anxiety-relieving breathing in a seated position in order to maintain your posture.     Position your right hand by bending your pointer and middle fingers into your palm, leaving your thumb, ring finger, and pinky extended. This is known as Mario mudra in yoga.  Close your eyes or softly gaze downward.  Inhale and exhale to begin.  Close off your right nostril with your thumb.  Inhale through your left nostril.  Close off your left nostril with your ring finger.  Open and exhale through your right nostril.  Inhale through your right nostril.  Close off your right nostril with your thumb.  Open and exhale through your left nostril.  Inhale through your left nostril.  Work up to 10 rounds of this breathing pattern. If you begin to feel lightheaded, take a break by releasing both nostrils and breathing normally.     Belly Breathing  According to The American [place] of Stress, 20 to 30 minutes of \"belly breathing,\" also known as abdominal breathing or diaphragmatic breathing, each day can reduce stress and anxiety.3     Find a comfortable, quiet place to sit or lie down. For example, try sitting in a chair, sitting cross-legged, or lying on your back with a small pillow under your head and another under your knees.     Place one hand on your upper chest and the other hand on your belly, below the ribcage.  Allow your belly to relax, without forcing it inward by squeezing or clenching your muscles.  Breathe in slowly through your nose. The air should move into your nose and downward so that you feel your stomach rise with your other hand and fall inward (toward your spine).  Exhale slowly through slightly pursed lips. Take note of " "the hand on your chest, which should remain relatively still.  Although the sequence frequency will vary according to your health, most people begin by doing the exercise three times and working up to five to 10 minutes, one to four times a day.     Box Breathing  Also known as four-square breathing, box breathing is very simple to learn and practice. In fact, if you've ever noticed yourself inhaling and exhaling to the rhythm of a song, you're already familiar with this type of paced breathing. It goes like this:     Exhale to a count of four.  Hold your lungs empty for a four-count.  Inhale to a count of four.  Hold the air in your lungs for a count of four.  Exhale and begin the pattern anew.  4-7-8 Breathing  The 4-7-8 breathing exercise, also called the relaxing breath, acts as a natural tranquilizer for the nervous system. At first, it's best to perform the exercise seated with your back straight. Once you become more familiar with this breathing exercise, however, you can perform it while lying in bed.     Place and keep the tip of your tongue against the ridge of tissue behind your upper front teeth for the duration of the exercise.  Completely exhale through your mouth, making a \"whoosh\" sound.  Close your mouth and inhale quietly through your nose to a mental count of four.  Hold your breath for a count of seven.  Exhale completely through your mouth, making a whoosh sound to a count of eight.  Lion s Breath  Lion s breath, or simhasana in hospitalsrit, during which you stick out your tongue and roar like a lion, is another helpful deep breathing practice. It can help relax the muscles in your face and jaw, alleviate stress, and improve cardiovascular function.4     The exercise is best performed in a comfortable, seated position, leaning forward slightly with your hands on your knees or the floor.     Spread your fingers as wide as possible.  Inhale through your nose.  Open your mouth wide, stick out your " "tongue, and stretch it down toward your chin.  Exhale forcefully, carrying the breath across the root of your tongue.  While exhaling, make a \"ha\" sound that comes from deep within your abdomen.  Breathe normally for a few moments.  Repeat lion s breath up to seven times.  Mindfulness Breathing  Mindfulness meditation involves focusing on your breathing and bringing your attention to the present without allowing your mind to drift to the past or future. Engaging in mindfulness breathing exercises serves the same purpose, which can help ease your anxiety.     One mindfulness breathing exercise to try involves choosing a calming focus, including a sound (\"om\"), positive word (\"peace\"), or phrase (\"breathe in calm, breathe out tension\") to repeat silently as you inhale or exhale. Let go and relax. If you notice that your mind has drifted, take a deep breath and gently return your attention to the present.     Pursed-Lip Breathing  Pursed-lip breathing is a simple breathing technique that will help make deep breaths slower and more intentional. This technique has been found to benefit people who have anxiety associated with lung conditions like emphysema and chronic obstructive pulmonary disease (COPD).5     Sit in a comfortable position with your neck and shoulders relaxed.  Keeping your mouth closed, inhale slowly through your nostrils for two seconds.  Exhale through your mouth for four seconds, puckering your lips as if giving a kiss.  Keep your breath slow and steady while breathing out.  To get the correct breathing pattern, experts recommend practicing pursed-lip breathing four to five times a day.6     Resonance Breathing  Resonance breathing, or coherent breathing, can help you get into a relaxed state and reduce anxiety.7     Lie down and close your eyes.  Gently breathe in through your nose, mouth closed, for a count of six seconds. Don't fill your lungs too full of air.  Exhale for six seconds, allowing your " breath to leave your body slowly and gently without forcing it.  Continue for up to 10 minutes.  Take a few additional minutes to be still and focus on how your body feels.  Simple Breathing Exercise  You can perform this simple breathing exercise as often as needed. It can be done standing up, sitting, or lying down. If you find this exercise difficult or believe it's making you anxious or panicky, stop for now. Try it again in a day or so and build up the time gradually.     Inhale slowly and deeply through your nose. Keep your shoulders relaxed. Your abdomen should expand, and your chest should rise very little.  Exhale slowly through your mouth. As you blow air out, purse your lips slightly but keep your jaw relaxed. You may hear a soft  whooshing  sound as you exhale.  Repeat this breathing exercise. Do it for several minutes until you start to feel better.     S: Stop. Whatever you're doing, just pause momentarily.  T: Take a breath. Re-connect with your breath. The breath is an anchor to the present moment.  O: Observe. Notice what is happening. What is happening inside you, and outside of you? ...  P: Proceed. Continue doing what you were doing.      Crisis Lines  Call or Text 328 - National Suicide and Crisis Lifeline    Crisis Text Line  Text 242188  You will be connected with a trained live crisis counselor to provide support.    The Shlomo Project (LGBTQ Youth Crisis Line)  2.138.189.9279  text START to 988-798    National [place] on Mental Illness (SHANTELLE)  116.133.2185 or 0.88.SHANTELLE.HELPS    National Suicide Prevention Lifeline at 7-142-624-TALK (5779)     Throughout  [place]: call **CRISIS (**715361)     Crisis Text Line: is available for free, 24/7 by texting [place] to 355950    Community Resources  Fast Tracker  Linking people to mental health and substance use disorder resources  O Entregadorn.org     [place] Mental Health Warm Line  Peer to peer support  Monday thru Saturday, 12 pm to 10  "pm  566.604.2369 or 0.256.044.2090  Text \"Support\" to 38284       Additional resources and information:      Additionally, here are some NA and AA meetings in your area. It would benefit you immensely to get connected to a recovery community and work on your sobriety.      [facility]  [address] [place] [address]  Mask Required            Sunday 10:30 am (10:30)     [facility]  [address] [place] [address]  Virtual Meeting  [email or web address]:00 am (11:00)        [address]  [address] [place] [address]  Directions: Entrance Located in Back of Building Sunday 5:30 pm (17:30)        [facility]  [address] [place] [address]  Hybrid - Meeting Virtually & In Person  [email or web address]  Directions: [address] entrance Sunday6:00 pm (18:00)     Dorota hebert [facility]  [address] [place] [address]           Sunday  6:00 pm (18:00)        [facility]  [address] [place] [address]           Sunday  6:30 pm (18:30)        [facility]  [address] [place] [address]  Rear Entrance           Sunday 6:30 pm (18:30)        [facility]  [address] [address]           Sunday  7:00 pm (19:00)        [facility]  [address] [place] [address]  Virtual Meeting  [email or web address]  Directions: rear of the newest of 3 buildings Sunday7:00 pm (19:00)        [facility]  [address] [place] [address] Sunday 7:00 pm (19:00)       National [place] on Mental Illness (www.[place].joseline.org): 988.403.2911 or 173-007-8807     [facility] Phone (free remote counseling): [phone] Web address:   [email or web address]     www.[email or web address] (filter " for insurance, gender preference, etc.)    [facility]   [phone]  Intake appointment will be virtual, following appointments can be in person or virtual.   **IMMEDIATE OPENINGS**    [facility]  [phone]  *offers individual therapy, medication management and Mental Health Case Workers; can self refer    [facility]  [phone]  *Immediate Openings    [facility]  [phone]  *Immediate Openings    [facility]  [phone]  *Immediate Openings    Please follow up with scheduled providers to ensure all necessary paperwork is filled out prior to your   scheduled telehealth appointments.     Coordinators from [facility] will be calling within two business days to ensure   that you have the resources you may need or provide assistance with scheduling (Phone number: [phone].).    Remember: give the referrals 3 sessions prior to calling it quits. Do you trust them? Do you feel   understood? Do you think they can help? Check in with yourself after each session    Please reach out to the [facility]([phone]) regarding further mental health appointment needs for this emergency department visit.    [facility] SCHEDULING:  Today you were seen by a licensed mental health professional through [facility] sevices, [facility] ([facility])  for a crisis assessment in the Emergency Department at [facility].  It is recommended that you follow up with your estabished providers (psychiatrist, menta health therapist, and/or primary care doctor - as relevant) as soon as possible. Coordinators from [facility] will be calling you in the next 24-48 hours to ensure that you have the resources you need.  You can so contact [facility] coordinators directly at [phone].     [facility] maintains an extensive network of licensed behavioral health providers to connect patients with the services they need.   We do not charge providers a fee to participate in our referral network.  We match patients with providers based on a patient s specific needs, insurance coverage, and location.  Our first effort will be to refer you to a provider within your care system, and will utilize providers outside your care system as needed.

## 2023-06-22 NOTE — ED NOTES
Pt was continously asking for Valium. This RN went and had a conversation with the pt on why he wasn't getting anymore valium. The pts CIWA score no longer showed any form of the pt going through withdrawals. The pt began to get more angry and upset because he wasn't getting his valium. The pt then stated he was going to leave. The pt came into the ed intoxicated and saying he wanted to commit suicide and that he had a plan in place. The pt was assessed by DEC which also came to the conclusion that the pt was suicidal. The pt was then placed on a Health Officer Hold and was no longer able to leave the ER. This was explained to the pt, but he insisted on leaving the ER. A code 21 was called on the pt and security arrived. Several RN's, security officers, and the MD tried to negotiate with the pt and explained to the pt that he was unable to leave the ER due to being placed on a hold because he was suicidal. The pt continued to be verbally aggressive and insisted on leaving the ER. The pt told everyone that he is getting out of here one way or another. IM Zyprexa was ordered for the pt. The pt was given the option to take the medication willingly or security would hold the pt down in order to give the medication. The pt refused to take the medication willingly. At this point, security held the pt down on the bed while the medication was given. (See MAR for times) Security continued to be on standby at the pts door while the medication took effect.

## 2023-06-22 NOTE — DISCHARGE INSTRUCTIONS
Aftercare Plan  If I am feeling unsafe or I am in a crisis, I will:   Contact my established care providers   Call the National Suicide Prevention Lifeline: 916.568.5715   Go to the nearest emergency room   Call 911     Warning signs that I or other people might notice when a crisis is developing for me:     I am having increasing suicidal thoughts that turn to plans with intent or means  I am having additional urges to self-harm    My emotions are of hopelessness; feeling like there's no way out.  Rage or anger.  Engaging in risky activities without thinking  Withdrawing from family/friends  Dramatic mood swings  Drastic personality changes   Use of alcohol or drugs  Postings on social media  Neglect of personal hygiene or cares     Things I am able to do on my own to cope or help me feel better:    Spending quality time with loved ones  Staying hydrated  Eating balanced meals  Going for a walk every day  Take care of daily responsibilities/needs  Focus on positive self-talk vs negative self-talk    Things that I am able to do with others to cope or help me better:   Exercise  Music  Deep breathing  Meditations  Journal  Self-regulate  Self check-in  Ask for help    Things I can use or do for distraction:   Reach out to/spend time with family, friends  Shower  Exercise  Chores or do a project  Listen to music  Watch movie/TV  Listening to music  Journaling  Reading a book  Meditating  Call a friend    Changes I can make to support my mental health and wellness:    -I will abstain from all mood altering chemicals not currently prescribed to me   -I will attend scheduled mental health therapy and psychiatric appointments and follow all   recommendations  -I will commit to 30 minutes of self care daily - this can be as simple as taking a shower, going for a   walk, cooking a meal, read, writing, etc  -I will practice square breathing when I begin to feel anxious - in breath through the nose for the count   of 4 and  the first line on the square. Out breath through the mouth for the count of 4 for the second line   of the square. Repeat to complete the square. Repeat the square as many times as needed.  - I will use distraction skills of: going for walks, watching TV, spending time outside, calling a friend or   family member  -Use community resources, including [employer] numbers, Pending sale to [facility] crisis and support meetings  -Maintain a daily schedule/routine  -Practice deep breathing skills  -Download a meditation oralia and spend 15-20 minutes per day mediating/relaxing. Some apps to   download include: Calm, Headspace and Insight Timer. All 3 of these apps have free version    Reduce Extreme Emotion  QUICKLY:  Changing Your Body Chemistry      T:  Change your body Temperature to change your autonomic nervous system   Use Ice Water to calm yourself down FAST   Put your face in a bowl of ice water (this is the best way; have the person keep his/her face in ice water for 30-45 seconds - initial research is showing that the longer s/he can hold her/his face in the water, the better the response), or   Splash ice water on your face, or hold an ice pack on your face      I:  Intensely exercise to calm down a body revved up by emotion   Examples: running, walking fast, jumping, playing basketball, weight lifting, swimming, calisthenics, etc.   Engage in exercises that DO NOT include violent behaviors. Exercises that utilize violent behaviors tend to function as  behavioral rehearsal,  and rather than calming the person down, may actually  rev  the person up more, increasing the likelihood of violence, and lessening the likelihood that they will  burn off  energy     P:  Progressively relax your muscles   Starting with your hands, moving to your forearms, upper arms, shoulders, neck, forehead, eyes, cheeks and lips, tongue and teeth, chest, upper back, stomach, buttocks, thighs, calves, ankles, feet   Tense (10 seconds,   of the way), then relax  each muscle (all the way)   Notice the tension   Notice the difference when relaxed (by tensing first, and then relaxing, you are able to get a more thorough relaxation than by simply relaxing)      P: Paced breathing to relax   The standard technique is to begin with counting the number of steps one takes for a typical inhale, then counting the steps one takes for a typical exhale, and then lengthening the amount of steps for the exhalation by one or two steps.  OR  Repeat this pattern for 1-2 minutes  Inhale for four (4) seconds   Exhale for six (6) to eight (8) seconds   Research demonstrated that one can change one's overall level of anxiety by doing this exercise for even a few minutes per day      People in my life that I can ask for help:   Family  Friends  Providers   Staff at [facility], [facility]    Your [facility] has a mental health crisis team you can call 24/7:   [facility] Crisis Line Number: [phone]  [facility] Mental Health Crisis: [phone] - Call the crisis line for immediate mental health support, 24 hours a day.   [facility] Crisis Line Number: [phone]  [facility] Crisis Line Number: [phone]  [facility] Crisis Line Number: [phone]   [facility] Crisis Line Number: [phone]  [place]: [phone]  [place]: [phone]  [facility] Crisis Number: [phone]  [facility] Crisis: [phone]      Other things that are important when I'm in crisis:   Ask for help    Additional resources and information:     Mental Health Apps  My3  https://my3app.org/    VirtualHopeBox  [email or web address]/virtual-hope-box/     Alternate-Nostril Breathing  Alternate-nostril breathing (maggie rosenberg) involves blocking off one nostril at a time as you breathe through the other, alternating between nostrils in a regular pattern.2 It's best to practice this type of anxiety-relieving breathing in a seated position in order to  "maintain your posture.     Position your right hand by bending your pointer and middle fingers into your palm, leaving your thumb, ring finger, and pinky extended. This is known as Mario mudra in yoga.  Close your eyes or softly gaze downward.  Inhale and exhale to begin.  Close off your right nostril with your thumb.  Inhale through your left nostril.  Close off your left nostril with your ring finger.  Open and exhale through your right nostril.  Inhale through your right nostril.  Close off your right nostril with your thumb.  Open and exhale through your left nostril.  Inhale through your left nostril.  Work up to 10 rounds of this breathing pattern. If you begin to feel lightheaded, take a break by releasing both nostrils and breathing normally.     Belly Breathing  According to The American Brandon of Stress, 20 to 30 minutes of \"belly breathing,\" also known as abdominal breathing or diaphragmatic breathing, each day can reduce stress and anxiety.3     Find a comfortable, quiet place to sit or lie down. For example, try sitting in a chair, sitting cross-legged, or lying on your back with a small pillow under your head and another under your knees.     Place one hand on your upper chest and the other hand on your belly, below the ribcage.  Allow your belly to relax, without forcing it inward by squeezing or clenching your muscles.  Breathe in slowly through your nose. The air should move into your nose and downward so that you feel your stomach rise with your other hand and fall inward (toward your spine).  Exhale slowly through slightly pursed lips. Take note of the hand on your chest, which should remain relatively still.  Although the sequence frequency will vary according to your health, most people begin by doing the exercise three times and working up to five to 10 minutes, one to four times a day.     Box Breathing  Also known as four-square breathing, box breathing is very simple to learn and " "practice. In fact, if you've ever noticed yourself inhaling and exhaling to the rhythm of a song, you're already familiar with this type of paced breathing. It goes like this:     Exhale to a count of four.  Hold your lungs empty for a four-count.  Inhale to a count of four.  Hold the air in your lungs for a count of four.  Exhale and begin the pattern anew.  4-7-8 Breathing  The 4-7-8 breathing exercise, also called the relaxing breath, acts as a natural tranquilizer for the nervous system. At first, it's best to perform the exercise seated with your back straight. Once you become more familiar with this breathing exercise, however, you can perform it while lying in bed.     Place and keep the tip of your tongue against the ridge of tissue behind your upper front teeth for the duration of the exercise.  Completely exhale through your mouth, making a \"whoosh\" sound.  Close your mouth and inhale quietly through your nose to a mental count of four.  Hold your breath for a count of seven.  Exhale completely through your mouth, making a whoosh sound to a count of eight.  Lion s Breath  Lion s breath, or simhasana in [facility], during which you stick out your tongue and roar like a lion, is another helpful deep breathing practice. It can help relax the muscles in your face and jaw, alleviate stress, and improve cardiovascular function.4     The exercise is best performed in a comfortable, seated position, leaning forward slightly with your hands on your knees or the floor.     Spread your fingers as wide as possible.  Inhale through your nose.  Open your mouth wide, stick out your tongue, and stretch it down toward your chin.  Exhale forcefully, carrying the breath across the root of your tongue.  While exhaling, make a \"ha\" sound that comes from deep within your abdomen.  Breathe normally for a few moments.  Repeat lion s breath up to seven times.  Mindfulness Breathing  Mindfulness meditation involves focusing on your " "breathing and bringing your attention to the present without allowing your mind to drift to the past or future. Engaging in mindfulness breathing exercises serves the same purpose, which can help ease your anxiety.     One mindfulness breathing exercise to try involves choosing a calming focus, including a sound (\"om\"), positive word (\"peace\"), or phrase (\"breathe in calm, breathe out tension\") to repeat silently as you inhale or exhale. Let go and relax. If you notice that your mind has drifted, take a deep breath and gently return your attention to the present.     Pursed-Lip Breathing  Pursed-lip breathing is a simple breathing technique that will help make deep breaths slower and more intentional. This technique has been found to benefit people who have anxiety associated with lung conditions like emphysema and chronic obstructive pulmonary disease (COPD).5     Sit in a comfortable position with your neck and shoulders relaxed.  Keeping your mouth closed, inhale slowly through your nostrils for two seconds.  Exhale through your mouth for four seconds, puckering your lips as if giving a kiss.  Keep your breath slow and steady while breathing out.  To get the correct breathing pattern, experts recommend practicing pursed-lip breathing four to five times a day.6     Resonance Breathing  Resonance breathing, or coherent breathing, can help you get into a relaxed state and reduce anxiety.7     Lie down and close your eyes.  Gently breathe in through your nose, mouth closed, for a count of six seconds. Don't fill your lungs too full of air.  Exhale for six seconds, allowing your breath to leave your body slowly and gently without forcing it.  Continue for up to 10 minutes.  Take a few additional minutes to be still and focus on how your body feels.  Simple Breathing Exercise  You can perform this simple breathing exercise as often as needed. It can be done standing up, sitting, or lying down. If you find this exercise " "difficult or believe it's making you anxious or panicky, stop for now. Try it again in a day or so and build up the time gradually.     Inhale slowly and deeply through your nose. Keep your shoulders relaxed. Your abdomen should expand, and your chest should rise very little.  Exhale slowly through your mouth. As you blow air out, purse your lips slightly but keep your jaw relaxed. You may hear a soft  whooshing  sound as you exhale.  Repeat this breathing exercise. Do it for several minutes until you start to feel better.     S: Stop. Whatever you're doing, just pause momentarily.  T: Take a breath. Re-connect with your breath. The breath is an anchor to the present moment.  O: Observe. Notice what is happening. What is happening inside you, and outside of you? ...  P: Proceed. Continue doing what you were doing.      Crisis Lines  Call or Text 040 - National Suicide and Crisis Lifeline    Crisis Text Line  Text 980725  You will be connected with a trained live crisis counselor to provide support.    The Shlomo Project (LGBTQ Youth Crisis Line)  1.665.016.2027  text START to 025-991    National Nelson on Mental Illness (SHANTELLE)  248.102.4629 or 6.888.SHANTELLE.HELPS    National Suicide Prevention Lifeline at 8-658-129-KUQV (1052)     Throughout  [place]: call **CRISIS (**948926)     Crisis Text Line: is available for free, 24/7 by texting [place] to 410790    Community Resources  Fast Tracker  Linking people to mental health and substance use disorder resources  [employer] SwedenckPinkUPn.org     [place] Mental Health Warm Line  Peer to peer support  Monday thru Saturday, 12 pm to 10 pm  362.208.0256 or 8.145.082.4072  Text \"Support\" to 25340       Additional resources and information:      Additionally, here are some NA and AA meetings in your area. It would benefit you immensely to get connected to a recovery community and work on your sobriety.      [facility]  [address] [place], [place] " [address]  Mask Required            Sunday 10:30 am (10:30)     [facility]  [address] [place] [address]  Virtual Meeting  [email or web address]:00 am (11:00)        [address]  [address] [place] [address]  Directions: Entrance Located in Back of Building Sunday 5:30 pm (17:30)        [facility]  [address] [place] [address]  Hybrid - Meeting Virtually & In Person  [email or web address]  Directions: [address] entrance Sunday6:00 pm (18:00)     Dorota hebert [facility]  [address] [place] [address]           Sunday  6:00 pm (18:00)        [facility]  [address] [place] [address]           Sunday  6:30 pm (18:30)        [facility]  [address] [place] [address]  Rear Entrance           Sunday 6:30 pm (18:30)        [facility]  [address] [address]           Sunday  7:00 pm (19:00)        [facility]  [address] [place] [address]  Virtual Meeting  [email or web address]  Directions: rear of the newest of 3 buildings Sunday7:00 pm (19:00)        [facility]  [address] [place] [address] Sunday 7:00 pm (19:00)       National [place] on Mental Illness (www.[place].joseline.org): 373.630.6866 or 143-701-0058     [facility] Phone (free remote counseling): [phone] Web address:   https://walkin.org/     [email or web address] (filter for insurance, gender preference, etc.)    [facility]   [phone]  Intake appointment will be virtual, following appointments can be in person or virtual.   **IMMEDIATE OPENINGS**    [facility]  [phone]  *offers individual therapy, medication management and Mental Health Case Workers; can self refer    [facility]  [phone]  *Immediate Openings    [facility]  [phone]  *Immediate Openings    [facility]  [phone]  *Immediate Openings    Please follow up with scheduled providers to ensure all necessary paperwork is filled out prior to your   scheduled telehealth appointments.     Coordinators from [facility] will be calling within two business days to ensure   that you have the resources you may need or provide assistance with scheduling (Phone number: [phone].).    Remember: give the referrals 3 sessions prior to calling it quits. Do you trust them? Do you feel   understood? Do you think they can help? Check in with yourself after each session    Please reach out to the [facility]([phone]) regarding further mental health appointment needs for this emergency department visit.    [facility] SCHEDULING:  Today you were seen by a licensed mental health professional through [facility] ([facility])  for a crisis assessment in the Emergency Department at [facility].  It is recommended that you follow up with your estabished providers (psychiatrist, menta health therapist, and/or primary care doctor - as relevant) as soon as possible. Coordinators from [facility] will be calling you in the next 24-48 hours to ensure that you have the resources you need.  You can so contact [facility] coordinators directly at [phone].     [facility] maintains an extensive network of licensed behavioral health providers to connect patients with the services they need.  We do not charge providers a fee to participate in our referral network.  We match patients with providers based on a patient s specific needs, insurance coverage, and location.  Our first effort will be to refer you to a provider within your care system, and will utilize providers outside your care system as needed.

## 2025-01-16 ENCOUNTER — PRE VISIT (OUTPATIENT)
Dept: UROLOGY | Facility: CLINIC | Age: 65
End: 2025-01-16
Payer: COMMERCIAL

## 2025-01-16 NOTE — TELEPHONE ENCOUNTER
Reason for visit: Unklnown     Relevant information: No referral placed    Records/imaging/labs/orders: Available in Epic, no recent imaging     Pt called: No need for a call    At Rooming: Melina Blackburn, EMT-P  1/16/2025  2:48 PM

## 2025-01-21 NOTE — TELEPHONE ENCOUNTER
MEDICAL RECORDS REQUEST   [facility]  [address] [place] [address]  PHONE: [phone]  Fax: [phone]        FUTURE VISIT INFORMATION                                                   Billy Calzada, : 1960 scheduled for future visit at [facility]    APPOINTMENT INFORMATION:  Date: 2025  Provider:  Elan Villarreal NP  Reason for Visit/Diagnosis: BLADDER CONTROL ISSUES      RECORDS REQUESTED FOR VISIT                                                     NOTES  STATUS/DETAILS   DISCHARGE REPORT from the ER  YES, -- 2018 -- [facility] ED   MEDICATION LIST  yes   LABS     URINALYSIS (UA)  no     PRE-VISIT CHECKLIST      Joint diagnostic appointment coordinated correctly          (ensure right order & amount of time) Yes   RECORD COLLECTION COMPLETE Yes

## 2025-01-22 ENCOUNTER — PRE VISIT (OUTPATIENT)
Dept: UROLOGY | Facility: CLINIC | Age: 65
End: 2025-01-22
Payer: COMMERCIAL

## 2025-01-22 NOTE — TELEPHONE ENCOUNTER
Reason for visit: Consult     Relevant information: Undescended Testicle     Pt called: No need for a call    At Rooming: Standard     Leonardo Blackburn, EMT-P  1/22/2025  1:47 PM

## 2025-01-24 ENCOUNTER — PRE VISIT (OUTPATIENT)
Dept: UROLOGY | Facility: CLINIC | Age: 65
End: 2025-01-24

## 2025-05-15 ENCOUNTER — HOSPITAL ENCOUNTER (EMERGENCY)
Facility: CLINIC | Age: 65
Discharge: HOME | End: 2025-05-16
Attending: STUDENT IN AN ORGANIZED HEALTH CARE EDUCATION/TRAINING PROGRAM | Admitting: STUDENT IN AN ORGANIZED HEALTH CARE EDUCATION/TRAINING PROGRAM
Payer: COMMERCIAL

## 2025-05-15 ENCOUNTER — APPOINTMENT (OUTPATIENT)
Dept: CT IMAGING | Facility: CLINIC | Age: 65
End: 2025-05-15
Attending: STUDENT IN AN ORGANIZED HEALTH CARE EDUCATION/TRAINING PROGRAM
Payer: COMMERCIAL

## 2025-05-15 DIAGNOSIS — F10.10 ETOH ABUSE: ICD-10-CM

## 2025-05-15 DIAGNOSIS — S01.81XA FACIAL LACERATION, INITIAL ENCOUNTER: ICD-10-CM

## 2025-05-15 PROCEDURE — 70450 CT HEAD/BRAIN W/O DYE: CPT

## 2025-05-15 PROCEDURE — 99284 EMERGENCY DEPT VISIT MOD MDM: CPT | Mod: 25 | Performed by: STUDENT IN AN ORGANIZED HEALTH CARE EDUCATION/TRAINING PROGRAM

## 2025-05-15 PROCEDURE — 250N000009 HC RX 250: Mod: JZ | Performed by: STUDENT IN AN ORGANIZED HEALTH CARE EDUCATION/TRAINING PROGRAM

## 2025-05-15 PROCEDURE — 72125 CT NECK SPINE W/O DYE: CPT | Mod: 26 | Performed by: RADIOLOGY

## 2025-05-15 PROCEDURE — 72125 CT NECK SPINE W/O DYE: CPT

## 2025-05-15 PROCEDURE — 70450 CT HEAD/BRAIN W/O DYE: CPT | Mod: 26 | Performed by: RADIOLOGY

## 2025-05-15 PROCEDURE — 12013 RPR F/E/E/N/L/M 2.6-5.0 CM: CPT | Performed by: STUDENT IN AN ORGANIZED HEALTH CARE EDUCATION/TRAINING PROGRAM

## 2025-05-15 PROCEDURE — 99284 EMERGENCY DEPT VISIT MOD MDM: CPT | Performed by: STUDENT IN AN ORGANIZED HEALTH CARE EDUCATION/TRAINING PROGRAM

## 2025-05-15 RX ADMIN — LIDOCAINE HYDROCHLORIDE 10 ML: 10 INJECTION, SOLUTION EPIDURAL; INFILTRATION; INTRACAUDAL; PERINEURAL at 22:48

## 2025-05-15 ASSESSMENT — ACTIVITIES OF DAILY LIVING (ADL)
ADLS_ACUITY_SCORE: 41

## 2025-05-15 ASSESSMENT — COLUMBIA-SUICIDE SEVERITY RATING SCALE - C-SSRS
2. HAVE YOU ACTUALLY HAD ANY THOUGHTS OF KILLING YOURSELF IN THE PAST MONTH?: NO
1. IN THE PAST MONTH, HAVE YOU WISHED YOU WERE DEAD OR WISHED YOU COULD GO TO SLEEP AND NOT WAKE UP?: NO
6. HAVE YOU EVER DONE ANYTHING, STARTED TO DO ANYTHING, OR PREPARED TO DO ANYTHING TO END YOUR LIFE?: NO

## 2025-05-15 NOTE — ED TRIAGE NOTES
Pt BIBA following a fall with new facial laceration. Pt intoxicated. Per EMS pt fell face forward and has a laceration on his forehead. PT REFUSING C-COLLAR AT THIS TIME, will notify provider. Pt denies LOC, blood thinner use, or any other complaints.      Triage Assessment (Adult)       Row Name 05/15/25 2924          Triage Assessment    Airway WDL WDL        Respiratory WDL    Respiratory WDL WDL        Skin Circulation/Temperature WDL    Skin Circulation/Temperature WDL WDL        Cardiac WDL    Cardiac WDL WDL        Peripheral/Neurovascular WDL    Peripheral Neurovascular WDL WDL        Cognitive/Neuro/Behavioral WDL    Cognitive/Neuro/Behavioral WDL WDL                      Statement Selected

## 2025-05-15 NOTE — ED PROVIDER NOTES
[place] EMERGENCY DEPARTMENT ([facility])    5/15/25       ED PROVIDER NOTE    History     Chief Complaint   Patient presents with    Facial Laceration    Alcohol Intoxication     HPI  Billy Calzada is a 65 year old male who presents to the ED for evaluation of fall, HPI is reported per EMS. Patient fell and hit his forehead. Patient has a laceration on his forehead. Patient notes he is on aspirin. Patient states he drank a lot of alcohol today. Patient denies loss of consciousness.     Past Medical History  Past Medical History:   Diagnosis Date    Alcohol abuse     Anxiety     Hyperlipidemia     Hypertension      No past surgical history on file.  atenolol (TENORMIN) 100 MG tablet  atenolol (TENORMIN) 50 MG tablet  lorazepam (ATIVAN) 2 MG tablet  Multiple Vitamins-Minerals (MULTIVITAL PO)  simvastatin (ZOCOR) 20 MG tablet  zolpidem (AMBIEN) 10 MG tablet      Allergies   Allergen Reactions    Iodine I 131 Tositumomab     Keflex [Cephalexin]      Family History  Family History   Problem Relation Age of Onset    Tremor Son     Tremor Son      Social History   Social History     Tobacco Use    Smoking status: Never    Tobacco comments:     KEVEN   Substance Use Topics    Alcohol use: Yes    Drug use: No      A complete review of systems was performed with pertinent positives and negatives noted in the HPI, and all other systems negative.    Physical Exam      Physical Exam  Constitutional:       General: He is not in acute distress.     Appearance: Normal appearance. He is not toxic-appearing.   HENT:      Head:      Comments: Approximately 4 cm laceration to mid forehead, facial muscles otherwise appear intact, able to raise brows, no facial asymmetry, no other signs of trauma to head or body  Eyes:      General: No scleral icterus.     Conjunctiva/sclera: Conjunctivae normal.   Cardiovascular:      Rate and Rhythm: Normal rate.      Heart sounds: Normal heart sounds.   Pulmonary:      Effort: Pulmonary  effort is normal. No respiratory distress.      Breath sounds: Normal breath sounds.   Abdominal:      General: There is no distension.      Palpations: Abdomen is soft. There is no mass.      Tenderness: There is no abdominal tenderness. There is no guarding or rebound.      Hernia: No hernia is present.   Musculoskeletal:         General: No deformity.      Cervical back: Neck supple.      Comments: 5 out of 5 strength and normal sensation bilateral upper and lower extremities   Skin:     General: Skin is warm.   Neurological:      Mental Status: He is alert.      Comments: Slurring speech consistent with alcohol intoxication, in bed ambulating with steady gait, A/O x 4, extraocular movements intact no pain, pupils PERRL bilaterally.           ED Course, Procedures, & Data      [facility]    -Laceration Repair    Date/Time: 5/15/2025 7:48 PM    Performed by: Rajinder Mcmullen MD  Authorized by: Rajinder Mcmullen MD    Risks, benefits and alternatives discussed.      ANESTHESIA (see MAR for exact dosages):     Anesthesia method:  Local infiltration    Local anesthetic:  Lidocaine 1% w/o epi  LACERATION DETAILS     Location:  Face    Face location:  Forehead    Length (cm):  4    Depth (mm):  2    REPAIR TYPE:     Repair type:  Simple    EXPLORATION:     Hemostasis achieved with:  Direct pressure    Wound extent: no foreign body, no signs of injury and no nerve damage      Contaminated: no      TREATMENT:     Area cleansed with:  Saline    Amount of cleaning:  Extensive    Irrigation solution:  Sterile saline    Irrigation volume:  1 L    Irrigation method:  Pressure wash    Visualized foreign bodies/material removed: no      SKIN REPAIR     Repair method:  Sutures    Suture size:  5-0    Suture material:  Prolene    Suture technique:  Simple interrupted    Number of sutures:  7    APPROXIMATION     Approximation:  Close    PROCEDURE    Patient Tolerance:  Patient  tolerated the procedure well with no immediate complications                  No results found for any visits on 05/15/25.  Medications - No data to display  Labs Ordered and Resulted from Time of ED Arrival to Time of ED Departure - No data to display  No orders to display          Critical care was not performed.     Medical Decision Making  The patient's presentation was of high complexity (an acute health issue posing potential threat to life or bodily function).    The patient's evaluation involved:  review of external note(s) from 3+ sources (see separate area of note for details)  review of 3+ test result(s) ordered prior to this encounter (see separate area of note for details)  ordering and/or review of 3+ test(s) in this encounter (see separate area of note for details)    The patient's management necessitated high risk (review of multiple CT scans, blood work, performing laceration repair, coordination of care).    Assessment & Plan    Patient arrived to the emergency room with a laceration on his forehead, and reporting intoxication today, but does not want to seek detox  After repairing his laceration as indicated in procedure note above, patient requested to leave, but at this point although patient was ambulating, and tolerating p.o., he was still clinically intoxicated, so told patient that he would need to wait until he was sober  Patient refused lab work which at this point we will let him refused as I do not believe that we will likely change his disposition or management  At time of signout to incoming physician, patient pending CT head and CT C-spine as well as reassessment when sober for discharge.    I have reviewed the nursing notes. I have reviewed the findings, diagnosis, plan and need for follow up with the patient.    New Prescriptions    No medications on file       Final diagnoses:   None       I, Mina Carrasco, am serving as a trained medical scribe to document services personally  performed by Rajinder Mcmullen MD based on the provider's statements to me on May 15, 2025.  This document has been checked and approved by the attending provider.    I, Rajinder Mcmullen MD, was physically present and have reviewed and verified the accuracy of this note documented by Mina Carrasco, medical scribe.      Rajinder Mcmullen MD  [facility] EMERGENCY DEPARTMENT  5/15/2025     Rajinder Mcmuleln MD  05/15/25 1950       Rajinder Mcmullen MD  05/15/25 1954

## 2025-05-15 NOTE — ED TRIAGE NOTES
After assessment pt stated that he is on 81mg ASA daily, and was agreeable to C-collar. C-collar is in place, MD at bedside discussing POC

## 2025-05-16 VITALS
RESPIRATION RATE: 18 BRPM | SYSTOLIC BLOOD PRESSURE: 150 MMHG | OXYGEN SATURATION: 100 % | HEART RATE: 91 BPM | DIASTOLIC BLOOD PRESSURE: 96 MMHG | TEMPERATURE: 98.1 F

## 2025-05-16 ASSESSMENT — ACTIVITIES OF DAILY LIVING (ADL): ADLS_ACUITY_SCORE: 41

## 2025-05-16 NOTE — DISCHARGE INSTRUCTIONS
Here in the emergency room we placed 7 sutures in the laceration on your head  Please return to an emergency room, urgent care or your primary doctor in 5 to 7 days for suture removal and reevaluation  If you develop any new or worsening symptoms in the interim, please return immediately to the emergency department.

## 2025-05-16 NOTE — ED PROVIDER NOTES
Patient was accepted at shift change signout pending CTs of his head and neck, likely discharge when sober.  CTs resulted:    IMPRESSION:  HEAD CT:  1.  No finding for intracranial hemorrhage, mass, or acute infarct.  2.  Superficial soft tissue contusion and laceration right forehead.     CERVICAL SPINE CT:  1.  Advanced cervical spondylosis without finding for acute fracture or posttraumatic subluxation.  2.  At least mild spinal canal narrowing C3-C4 through C6-C7. Multilevel high-grade foraminal narrowing C3-C4 through C7-T1.     The patient denies any pain in his neck, pain down his arms, numbness, weakness.  Strength is equal.  I did speak with the neurosurgery resident who said that they would not recommend intervention if he was not symptomatic, this is likely chronic.  I did allow the patient to sober more, he continues to deny symptoms.  He states he is feeling much improved.  He denies feeling ill recently.  He declines any chemical abuse resources.  He states that he has an apartment.  He feels he is ready to discharge.  He appears clinically sober at this time.  Encouraged to follow-up with his outpatient provider, return with any concerns.    Dictation Disclaimer: Some of this Note has been completed with voice-recognition dictation software. Although errors are generally corrected real-time, there is the potential for a rare error to be present in the completed chart.       Obdulia Linares MD  05/16/25 0001

## 2025-05-16 NOTE — ED NOTES
Patient discharge home. Patient remains alert and oriented x 4, well able to make needs known, and vitally stable at discharge

## 2025-05-21 ENCOUNTER — HOSPITAL ENCOUNTER (EMERGENCY)
Facility: CLINIC | Age: 65
Discharge: HOME | End: 2025-05-21
Attending: EMERGENCY MEDICINE
Payer: COMMERCIAL

## 2025-05-21 VITALS
OXYGEN SATURATION: 99 % | HEART RATE: 78 BPM | TEMPERATURE: 98.2 F | DIASTOLIC BLOOD PRESSURE: 84 MMHG | RESPIRATION RATE: 16 BRPM | SYSTOLIC BLOOD PRESSURE: 128 MMHG

## 2025-05-21 DIAGNOSIS — Z48.02 VISIT FOR SUTURE REMOVAL: ICD-10-CM

## 2025-05-21 PROCEDURE — 999N000104 HC STATISTIC NO CHARGE: Performed by: EMERGENCY MEDICINE

## 2025-05-21 ASSESSMENT — COLUMBIA-SUICIDE SEVERITY RATING SCALE - C-SSRS
1. IN THE PAST MONTH, HAVE YOU WISHED YOU WERE DEAD OR WISHED YOU COULD GO TO SLEEP AND NOT WAKE UP?: NO
6. HAVE YOU EVER DONE ANYTHING, STARTED TO DO ANYTHING, OR PREPARED TO DO ANYTHING TO END YOUR LIFE?: NO
2. HAVE YOU ACTUALLY HAD ANY THOUGHTS OF KILLING YOURSELF IN THE PAST MONTH?: NO

## 2025-05-21 ASSESSMENT — ACTIVITIES OF DAILY LIVING (ADL): ADLS_ACUITY_SCORE: 41

## 2025-05-21 NOTE — ED TRIAGE NOTES
Pt has laceration on forehead with sutures. States lac started bleeding last night. Wants to see if he can have stitches removed.

## 2025-05-22 NOTE — DISCHARGE INSTRUCTIONS
TODAY'S VISIT:  You were seen today for suture removal    Keep the affected area as clean and dry as it is possible.  You may leave the wound uncovered or change the dressing daily or whenever it becomes soiled.  Make sure to keep the wound covered when participating in any activities in which the affected area may become dirty.  Avoid swimming or submerging the affected area in the tub/bath for at least 1 week.  Showering is okay. Return to the emergency department immediately if you develop any signs of infection, including fevers/chills or increasing pain/redness/swelling/warmth/cloudy discharge from around the wound or if bleeding from the wound occurs and cannot be controlled with direct pressure to the wound. You may apply bacitracin to the laceration twice daily. Make sure to use sunscreen in the future whenever sun exposure to the affected area may occur in order to help minimize scarring.    Once the skin has healed, you can try using silicone scar strips to help minimize scarring.   -   - If you had any labs or imaging/radiology tests performed today, you should also discuss these tests with your usual provider.     FOLLOW-UP:  Please make an appointment to follow up with:  - Your Primary Care Provider. If you do not have a PCP, please call the Primary Care Center (phone: [phone] for an appointment    - Have your provider review the results from today's visit with you again to make sure no further follow-up or additional testing is needed based on those results.     RETURN TO THE EMERGENCY DEPARTMENT  Return to the Emergency Department at any time for any new or worsening symptoms or any concerns.

## 2025-05-22 NOTE — ED PROVIDER NOTES
History     Chief Complaint   Patient presents with    Suture Removal            HPI  Billy Calzada is a 65 year old male who with past medical history of atrial fibrillation, allergic rhinitis, hypertension, alcohol use disorder, hyperlipidemia and Peyronie's disease presents to the ED for suture removal.  The patient reports that he recently received sutures in the emergency department here provider and was told to have them removed in about 5 days.  He does note that he had a small amount of bleeding from the wound today, but otherwise has had no issues with it.  He is afebrile.  No surrounding erythema, swelling, or purulent discharge from the wound.     Per chart review, patient presented to [facility] ED on 5/15/2025 and was discharged on 5/16/2025 due to facial laceration and alcohol intoxication.  Patient's laceration was repaired with 7 sutures.  He reports that he was advised to return for suture removal in about 5 days.    Per review of [facility], patient's last Tdap was in 2019.     IMPRESSION:  HEAD CT:  1.  No finding for intracranial hemorrhage, mass, or acute infarct.  2.  Superficial soft tissue contusion and laceration right forehead.     CERVICAL SPINE CT:  1.  Advanced cervical spondylosis without finding for acute fracture or posttraumatic subluxation.  2.  At least mild spinal canal narrowing C3-C4 through C6-C7. Multilevel high-grade foraminal narrowing C3-C4 through C7-T1.       Pt has laceration on forehead with sutures. States lac started bleeding last night. Wants to see if he can have stitches removed.     I have reviewed the Medications, Allergies, Past Medical and Surgical History, and Social History in the [employer] system.    Past Medical History:   Diagnosis Date    Alcohol abuse     Anxiety     Hyperlipidemia     Hypertension      No past surgical history on file.  No current facility-administered medications for this encounter.     Current Outpatient Medications   Medication Sig  Dispense Refill    atenolol (TENORMIN) 100 MG tablet Take 100 mg by mouth daily.      atenolol (TENORMIN) 50 MG tablet Take 2 tablets (100 mg) by mouth daily 6 tablet 0    lorazepam (ATIVAN) 2 MG tablet Take 2 mg by mouth 2 times daily as needed.      Multiple Vitamins-Minerals (MULTIVITAL PO) Take  by mouth.      simvastatin (ZOCOR) 20 MG tablet Take 20 mg by mouth At Bedtime.      zolpidem (AMBIEN) 10 MG tablet Take 1 tablet (10 mg) by mouth nightly as needed for sleep 10 tablet 0     Allergies   Allergen Reactions    Iodine I 131 Tositumomab     Keflex [Cephalexin]      Past medical history, past surgical history, medications, and allergies were reviewed with the patient. Additional pertinent items: None    Social History     Socioeconomic History    Marital status:      Spouse name: Not on file    Number of children: Not on file    Years of education: Not on file    Highest education level: Not on file   Occupational History    Not on file   Tobacco Use    Smoking status: Never    Smokeless tobacco: Not on file    Tobacco comments:     KEVEN   Substance and Sexual Activity    Alcohol use: Yes    Drug use: No    Sexual activity: Not on file   Other Topics Concern    Not on file   Social History Narrative    ** Merged History Encounter **         Lives alone in apartment. Has two children.     Social Drivers of Health     Financial Resource Strain: Low Risk  (5/24/2022)    Received from [employer]    Financial Resource Strain     Difficulty of Paying Living Expenses: 3     Difficulty of Paying Living Expenses: Not on file   Food Insecurity: Not on File (9/26/2024)    Received from [employer]    Food Insecurity     Food: 0   Transportation Needs: No Transportation Needs (5/24/2022)    Received from [employer]    Transportation Needs     Lack of Transportation (Medical): 1   Physical Activity: Not on File (10/16/2021)    Received from [employer]     Physical Activity     Physical Activity: 0   Stress: Not on File (10/16/2021)    Received from [employer]    Stress     Stress: 0   Social Connections: Not on File (9/15/2024)    Received from [employer]    Social Connections     Connectedness: 0   Interpersonal Safety: Unknown (9/15/2024)    [facility]    Humiliation, Afraid, Rape, and Kick questionnaire     Fear of Current or Ex-Partner: Not on file     Emotionally Abused: No     Physically Abused: No     Sexually Abused: No   Housing Stability: Low Risk  (5/24/2022)    Received from [employer] [facility] [employer]    Housing Stability     Unable to Pay for Housing in the Last Year: 1     Social history was reviewed with the patient. Additional pertinent items: None    Review of Systems  A medically appropriate review of systems was performed with pertinent positives and negatives noted in the HPI, and all other systems negative.    Physical Exam   BP: 105/68  Pulse: 85  Temp: 98  F (36.7  C)  Resp: 18  SpO2: 97 %      General: Well nourished, well developed, NAD  HEENT: EOMI, anicteric. NCAT, MMM  Neck: no jugular venous distension, supple, nl ROM  Cardiac: Regular rate, extremities appear well-perfused  Pulm: LB, normal RR  Skin: Healing laceration to forehead with small amount of scabbing overlying the wound, no surrounding erythema/induration/swelling/fluctuance.  No purulent drainage noted.  No active bleeding.  Extremities: No LE edema, no cyanosis, w/w/p  Neuro: A&Ox3, no gross focal deficits, steady gait    ED Course        [facility]    Suture Removal    Date/Time: 5/22/2025 5:16 PM    Performed by: Soraida Correa MD  Authorized by: Soraida Correa MD    Risks, benefits and alternatives discussed.      LOCATION     Location:  Head/neck    Head/neck location:  Forehead    PROCEDURE DETAILS     Wound appearance:  No signs of infection and good wound healing    Number of sutures  removed:  7    PROCEDURE    Patient Tolerance:  Patient tolerated the procedure well with no immediate complications                        Labs Ordered and Resulted from Time of ED Arrival to Time of ED Departure - No data to display         No results found for this or any previous visit (from the past 24 hours).    Labs, vital signs, and imaging studies were reviewed by me.    Medications - No data to display    Assessments & Plan (with Medical Decision Making)   Billy Calzada is a 65 year old male who presents to the emergency department for suture removal.  Sutures were removed as described above.  Patient concerned about small amount of bleeding from the wound today, no active bleeding on my evaluation.  No signs of infection at this time.    Critical care was not performed.     Medical Decision Making  The patient's presentation was of low complexity (an acute and uncomplicated illness or injury).    The patient's evaluation involved:  review of external note(s) from 1 sources ([facility])  review of 2 test result(s) ordered prior to this encounter (imaging from recent ER visit)    The patient's management necessitated moderate risk (a decision regarding minor procedure (suture removal) with risk factors of none) and moderate risk (limitations due to social determinants of health (substance use)).    I have reviewed the nursing notes.    I have reviewed the findings, diagnosis, plan and need for follow up with the patient.    Patient to be discharged home. Patient advised to apply bacitracin to the wound as it continues to heal.  Advised he may keep it covered with a bandage if he so chooses. Instructed to keep the affected area as clean and dry as possible and to return to ER immediately with any new/worsening symptoms, particularly any signs of developing infection such as fevers/chills or increasing pain/redness/swelling/warmth/cloudy discharge from around the wound or if bleeding from the wound occurs and  cannot be controlled with direct pressure to the wound.  Patient was advised to change any dressings used at least daily or whenever it becomes soiled.   Patient was also advised to use sunscreen in the future whenever sun exposure to the affected area may occur in order to minimize scarring.  Patient was advised that silicone scar strips may also help with scar reduction.  Plan of care discussed with patient who expresses understanding and agrees with plan of care.      Discharge Medication List as of 5/21/2025 10:03 PM          Final diagnoses:   Visit for suture removal       CHANCE CORREA MD  5/21/2025   [facility] EMERGENCY DEPARTMENT       Chance Correa MD  05/22/25 1626

## 2025-05-22 NOTE — ED PROVIDER NOTES
[place] EMERGENCY DEPARTMENT ([facility])    5/21/25       History     Chief Complaint   Patient presents with    Suture Removal            HPI  Billy Calzada is a 65 year old male who with past medical history of atrial fibrillation, allergic rhinitis, hypertension, alcohol use disorder, hyperlipidemia and Peyronie's disease presents to the ED for suture removal.    As per epic review:  Patient presented to [facility] ED on 5/15/2025 and was discharged on 5/16/2025 due to facial laceration and alcohol abuse.  Patient's laceration was repaired and he was discharged after he became sober.    IMPRESSION:  HEAD CT:  1.  No finding for intracranial hemorrhage, mass, or acute infarct.  2.  Superficial soft tissue contusion and laceration right forehead.     CERVICAL SPINE CT:  1.  Advanced cervical spondylosis without finding for acute fracture or posttraumatic subluxation.  2.  At least mild spinal canal narrowing C3-C4 through C6-C7. Multilevel high-grade foraminal narrowing C3-C4 through C7-T1.      Past Medical History  Past Medical History:   Diagnosis Date    Alcohol abuse     Anxiety     Hyperlipidemia     Hypertension      No past surgical history on file.  atenolol (TENORMIN) 100 MG tablet  atenolol (TENORMIN) 50 MG tablet  lorazepam (ATIVAN) 2 MG tablet  Multiple Vitamins-Minerals (MULTIVITAL PO)  simvastatin (ZOCOR) 20 MG tablet  zolpidem (AMBIEN) 10 MG tablet      Allergies   Allergen Reactions    Iodine I 131 Tositumomab     Keflex [Cephalexin]      Family History  Family History   Problem Relation Age of Onset    Tremor Son     Tremor Son      Social History   Social History     Tobacco Use    Smoking status: Never    Tobacco comments:     KEVEN   Substance Use Topics    Alcohol use: Yes    Drug use: No      Past medical history, past surgical history, medications, allergies, family history, and social history were reviewed with the patient. No additional pertinent items.   A complete  "review of systems was performed with pertinent positives and negatives noted in the HPI, and all other systems negative.    Physical Exam   BP: 105/68  Pulse: 85  Temp: 98  F (36.7  C)  Resp: 18  SpO2: 97 %  Physical Exam  ***    ED Course, Procedures, & Data      Procedures       {ED Course Selections (Optional):746356}  {ED Sepsis CMS Documentation (Optional):942753::\" \"}       No results found for any visits on 05/21/25.  Medications - No data to display  Labs Ordered and Resulted from Time of ED Arrival to Time of ED Departure - No data to display  No orders to display          {Critical Care Performed?:318849}    Assessment & Plan    ***    I have reviewed the nursing notes. I have reviewed the findings, diagnosis, plan and need for follow up with the patient.    New Prescriptions    No medications on file       Final diagnoses:   None       ***  [facility] EMERGENCY DEPARTMENT  5/21/2025  "

## 2025-07-04 ENCOUNTER — HOSPITAL ENCOUNTER (OUTPATIENT)
Facility: CLINIC | Age: 65
Setting detail: OBSERVATION
Discharge: LEFT BEFORE DISCHARGE | End: 2025-07-05
Attending: EMERGENCY MEDICINE | Admitting: EMERGENCY MEDICINE
Payer: COMMERCIAL

## 2025-07-04 DIAGNOSIS — F10.929 ALCOHOLIC INTOXICATION WITH COMPLICATION: ICD-10-CM

## 2025-07-04 DIAGNOSIS — Z79.01 LONG TERM (CURRENT) USE OF ANTICOAGULANTS: Primary | ICD-10-CM

## 2025-07-04 LAB
ALBUMIN SERPL BCG-MCNC: 4.2 G/DL (ref 3.5–5.2)
ALP SERPL-CCNC: 63 U/L (ref 40–150)
ALT SERPL W P-5'-P-CCNC: 19 U/L (ref 0–70)
ANION GAP SERPL CALCULATED.3IONS-SCNC: 14 MMOL/L (ref 7–15)
APTT PPP: 26 SECONDS (ref 22–38)
AST SERPL W P-5'-P-CCNC: 24 U/L (ref 0–45)
BASOPHILS # BLD AUTO: 0.1 10E3/UL (ref 0–0.2)
BASOPHILS NFR BLD AUTO: 2 %
BILIRUB SERPL-MCNC: 0.3 MG/DL
BILIRUBIN DIRECT (ROCHE PRO & PURE): 0.16 MG/DL (ref 0–0.45)
BUN SERPL-MCNC: 15.7 MG/DL (ref 8–23)
CALCIUM SERPL-MCNC: 8.6 MG/DL (ref 8.8–10.4)
CHLORIDE SERPL-SCNC: 106 MMOL/L (ref 98–107)
CREAT SERPL-MCNC: 1.06 MG/DL (ref 0.67–1.17)
EGFRCR SERPLBLD CKD-EPI 2021: 79 ML/MIN/1.73M2
EOSINOPHIL # BLD AUTO: 0.1 10E3/UL (ref 0–0.7)
EOSINOPHIL NFR BLD AUTO: 2 %
ERYTHROCYTE [DISTWIDTH] IN BLOOD BY AUTOMATED COUNT: 12.9 % (ref 10–15)
ETHANOL SERPL-MCNC: 0.38 G/DL
GLUCOSE SERPL-MCNC: 102 MG/DL (ref 70–99)
HCO3 SERPL-SCNC: 21 MMOL/L (ref 22–29)
HCT VFR BLD AUTO: 39.8 % (ref 40–53)
HGB BLD-MCNC: 13.4 G/DL (ref 13.3–17.7)
IMM GRANULOCYTES # BLD: 0 10E3/UL
IMM GRANULOCYTES NFR BLD: 0 %
INR PPP: 1.04 (ref 0.85–1.15)
LYMPHOCYTES # BLD AUTO: 2.6 10E3/UL (ref 0.8–5.3)
LYMPHOCYTES NFR BLD AUTO: 50 %
MAGNESIUM SERPL-MCNC: 2.1 MG/DL (ref 1.7–2.3)
MCH RBC QN AUTO: 34 PG (ref 26.5–33)
MCHC RBC AUTO-ENTMCNC: 33.7 G/DL (ref 31.5–36.5)
MCV RBC AUTO: 101 FL (ref 78–100)
MONOCYTES # BLD AUTO: 0.5 10E3/UL (ref 0–1.3)
MONOCYTES NFR BLD AUTO: 10 %
NEUTROPHILS # BLD AUTO: 1.8 10E3/UL (ref 1.6–8.3)
NEUTROPHILS NFR BLD AUTO: 36 %
NRBC # BLD AUTO: 0 10E3/UL
NRBC BLD AUTO-RTO: 0 /100
PLAT MORPH BLD: NORMAL
PLATELET # BLD AUTO: 236 10E3/UL (ref 150–450)
POTASSIUM SERPL-SCNC: 4.1 MMOL/L (ref 3.4–5.3)
PROT SERPL-MCNC: 7.1 G/DL (ref 6.4–8.3)
PROTHROMBIN TIME: 13.9 SECONDS (ref 11.8–14.8)
RBC # BLD AUTO: 3.94 10E6/UL (ref 4.4–5.9)
RBC MORPH BLD: NORMAL
SODIUM SERPL-SCNC: 141 MMOL/L (ref 135–145)
WBC # BLD AUTO: 5.1 10E3/UL (ref 4–11)

## 2025-07-04 PROCEDURE — 82077 ASSAY SPEC XCP UR&BREATH IA: CPT | Performed by: EMERGENCY MEDICINE

## 2025-07-04 PROCEDURE — 85610 PROTHROMBIN TIME: CPT | Performed by: EMERGENCY MEDICINE

## 2025-07-04 PROCEDURE — 99285 EMERGENCY DEPT VISIT HI MDM: CPT

## 2025-07-04 PROCEDURE — 83735 ASSAY OF MAGNESIUM: CPT | Performed by: EMERGENCY MEDICINE

## 2025-07-04 PROCEDURE — 85025 COMPLETE CBC W/AUTO DIFF WBC: CPT | Performed by: EMERGENCY MEDICINE

## 2025-07-04 PROCEDURE — 36415 COLL VENOUS BLD VENIPUNCTURE: CPT | Performed by: EMERGENCY MEDICINE

## 2025-07-04 PROCEDURE — 99285 EMERGENCY DEPT VISIT HI MDM: CPT | Performed by: EMERGENCY MEDICINE

## 2025-07-04 PROCEDURE — 82248 BILIRUBIN DIRECT: CPT | Performed by: EMERGENCY MEDICINE

## 2025-07-04 PROCEDURE — 80053 COMPREHEN METABOLIC PANEL: CPT | Performed by: EMERGENCY MEDICINE

## 2025-07-04 PROCEDURE — 85730 THROMBOPLASTIN TIME PARTIAL: CPT | Performed by: EMERGENCY MEDICINE

## 2025-07-04 PROCEDURE — G0378 HOSPITAL OBSERVATION PER HR: HCPCS

## 2025-07-04 ASSESSMENT — ACTIVITIES OF DAILY LIVING (ADL)
ADLS_ACUITY_SCORE: 41

## 2025-07-05 ENCOUNTER — APPOINTMENT (OUTPATIENT)
Dept: CT IMAGING | Facility: CLINIC | Age: 65
End: 2025-07-05
Attending: EMERGENCY MEDICINE
Payer: COMMERCIAL

## 2025-07-05 VITALS
HEIGHT: 73 IN | WEIGHT: 187 LBS | OXYGEN SATURATION: 95 % | BODY MASS INDEX: 24.78 KG/M2 | RESPIRATION RATE: 18 BRPM | HEART RATE: 69 BPM | DIASTOLIC BLOOD PRESSURE: 58 MMHG | SYSTOLIC BLOOD PRESSURE: 108 MMHG | TEMPERATURE: 98.6 F

## 2025-07-05 PROCEDURE — 72125 CT NECK SPINE W/O DYE: CPT | Mod: 26 | Performed by: RADIOLOGY

## 2025-07-05 PROCEDURE — G0378 HOSPITAL OBSERVATION PER HR: HCPCS

## 2025-07-05 PROCEDURE — 72125 CT NECK SPINE W/O DYE: CPT

## 2025-07-05 PROCEDURE — 70450 CT HEAD/BRAIN W/O DYE: CPT | Mod: 26 | Performed by: RADIOLOGY

## 2025-07-05 PROCEDURE — 70450 CT HEAD/BRAIN W/O DYE: CPT

## 2025-07-05 ASSESSMENT — ACTIVITIES OF DAILY LIVING (ADL)
ADLS_ACUITY_SCORE: 41

## 2025-07-05 NOTE — ED TRIAGE NOTES
BIBA  Found on the sidewalk with beer cans beside him  With abrasion on right side of the forehead  Claimed to be on blood thinner but not sure what medication it is  Conversant but not oriented to place and date but aware of what happened

## 2025-07-05 NOTE — ED PROVIDER NOTES
ED Provider Note  [facility]      History     Chief Complaint   Patient presents with    Alcohol Intoxication     HPI  Billy Calzada is a 65 year old male with a history of atrial fibrillation, allergic rhinitis, hypertension, alcohol use disorder, hyperlipidemia and Peyronie's disease who presents to the emergency department for alcohol intoxication. The patient states that he drank liquor and now has pain throughout his whole body. He does have a headache and neck pain. EMS noted that he was found on the sidewalk with beer cans around him but no one is sure what happened to him. He states he is on a blood thinner but it unsure which one.    Past Medical History  Past Medical History:   Diagnosis Date    Alcohol abuse     Anxiety     Hyperlipidemia     Hypertension      No past surgical history on file.  atenolol (TENORMIN) 100 MG tablet  atenolol (TENORMIN) 50 MG tablet  lorazepam (ATIVAN) 2 MG tablet  Multiple Vitamins-Minerals (MULTIVITAL PO)  simvastatin (ZOCOR) 20 MG tablet  zolpidem (AMBIEN) 10 MG tablet      Allergies   Allergen Reactions    Iodine I-131 Tositumomab     Keflex [Cephalexin]      Family History  Family History   Problem Relation Age of Onset    Tremor Son     Tremor Son      Social History   Social History     Tobacco Use    Smoking status: Never    Tobacco comments:     KEVEN   Substance Use Topics    Alcohol use: Yes    Drug use: No      A medically appropriate review of systems was performed with pertinent positives and negatives noted in the HPI, and all other systems negative.    Physical Exam      Physical Exam  Vitals and nursing note reviewed.   Constitutional:       General: He is not in acute distress.     Appearance: Normal appearance. He is not toxic-appearing.      Comments: Patient appears to be intoxicated   HENT:      Head: Atraumatic.   Eyes:      General: No scleral icterus.     Conjunctiva/sclera: Conjunctivae normal.   Cardiovascular:      Rate  and Rhythm: Normal rate.      Heart sounds: Normal heart sounds.   Pulmonary:      Effort: Pulmonary effort is normal. No respiratory distress.      Breath sounds: Normal breath sounds.   Abdominal:      Palpations: Abdomen is soft.      Tenderness: There is no abdominal tenderness.   Musculoskeletal:         General: No deformity.      Cervical back: Neck supple.   Skin:     General: Skin is warm.      Comments: Forehead scalp abrasion             ED Course, Procedures, & Data      Procedures           Medications - No data to display       Critical care was not performed.     Medical Decision Making  The patient's presentation was of high complexity (an acute health issue posing potential threat to life or bodily function).    The patient's evaluation involved:  ordering and/or review of 3+ test(s) in this encounter (see separate area of note for details)    The patient's management necessitated further care after sign-out to overnight ED staff (see their note for further management).    Assessment & Plan      65 year old male with a history of atrial fibrillation, allergic rhinitis, hypertension, alcohol use disorder, hyperlipidemia and Peyronie's disease who presents to the emergency department for alcohol intoxication.  Vital signs stable and afebrile including normal pulse ox at 94% on room air.  IV established, labs sent which revealed serum alcohol 0.38, otherwise normal CBC and electrolytes, normal coags.  Patient had a CT of the head and cervical spine ordered, he was initially declining the imaging studies however he was eventually agreeable.  Patient will be signed out to overnight ED staff pending completion of imaging studies, metabolization of his alcohol toxidrome, reassessment with plan for likely discharge.    I have reviewed the nursing notes. I have reviewed the findings, diagnosis, plan and need for follow up with the patient.    New Prescriptions    No medications on file       Final diagnoses:    Alcoholic intoxication with complication   I, Amy Mg, am serving as a trained medical scribe to document services personally performed by Di Gonzalez MD, based on the provider's statements to me.     I, Di Gonzalez MD, was physically present and have reviewed and verified the accuracy of this note documented by Amy Mg.     Di Gonzalez MD  [facility] EMERGENCY DEPARTMENT  7/4/2025     Di Gonzalez MD  07/05/25 0036